# Patient Record
Sex: FEMALE | Race: WHITE | NOT HISPANIC OR LATINO | Employment: OTHER | URBAN - METROPOLITAN AREA
[De-identification: names, ages, dates, MRNs, and addresses within clinical notes are randomized per-mention and may not be internally consistent; named-entity substitution may affect disease eponyms.]

---

## 2020-02-26 ENCOUNTER — APPOINTMENT (OUTPATIENT)
Dept: CT IMAGING | Facility: CLINIC | Age: 70
End: 2020-02-26
Attending: NURSE PRACTITIONER
Payer: MEDICARE

## 2020-02-26 ENCOUNTER — HOSPITAL ENCOUNTER (EMERGENCY)
Facility: CLINIC | Age: 70
Discharge: HOME | End: 2020-02-26
Attending: NURSE PRACTITIONER | Admitting: NURSE PRACTITIONER
Payer: MEDICARE

## 2020-02-26 VITALS
RESPIRATION RATE: 16 BRPM | HEART RATE: 94 BPM | TEMPERATURE: 98.2 F | DIASTOLIC BLOOD PRESSURE: 93 MMHG | OXYGEN SATURATION: 96 % | SYSTOLIC BLOOD PRESSURE: 151 MMHG

## 2020-02-26 DIAGNOSIS — Z79.01 CHRONIC ANTICOAGULATION: ICD-10-CM

## 2020-02-26 DIAGNOSIS — S09.90XA CLOSED HEAD INJURY, INITIAL ENCOUNTER: ICD-10-CM
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
## 2020-02-26 PROBLEM — M54.50 LOW BACK PAIN: Status: ACTIVE | Noted: 2019-08-24

## 2020-02-26 PROBLEM — F41.8 DEPRESSION WITH ANXIETY: Status: ACTIVE | Noted: 2018-03-20

## 2020-02-26 PROBLEM — M17.9 KNEE OSTEOARTHRITIS: Status: ACTIVE | Noted: 2018-03-20

## 2020-02-26 PROBLEM — G47.33 OSA (OBSTRUCTIVE SLEEP APNEA): Status: ACTIVE | Noted: 2018-03-20

## 2020-02-26 PROBLEM — M85.80 OSTEOPENIA: Status: ACTIVE | Noted: 2018-03-20

## 2020-02-26 PROBLEM — I10 HTN (HYPERTENSION): Status: ACTIVE | Noted: 2018-03-20

## 2020-02-26 PROBLEM — F51.5 NIGHTMARE DISORDER: Status: ACTIVE | Noted: 2019-08-24

## 2020-02-26 PROBLEM — M54.2 CERVICALGIA: Status: ACTIVE | Noted: 2019-08-24

## 2020-02-26 PROBLEM — Z79.891 LONG TERM (CURRENT) USE OF OPIATE ANALGESIC: Status: ACTIVE | Noted: 2019-08-24

## 2020-02-26 PROBLEM — R93.1 AGATSTON CORONARY ARTERY CALCIUM SCORE LESS THAN 100: Status: ACTIVE | Noted: 2018-06-04

## 2020-02-26 PROBLEM — M50.30 DDD (DEGENERATIVE DISC DISEASE), CERVICAL: Status: ACTIVE | Noted: 2018-03-20

## 2020-02-26 PROBLEM — E03.9 HYPOTHYROIDISM (ACQUIRED): Status: ACTIVE | Noted: 2018-03-20

## 2020-02-26 PROCEDURE — 25000132 ZZH RX MED GY IP 250 OP 250 PS 637: Mod: GY | Performed by: NURSE PRACTITIONER

## 2020-02-26 PROCEDURE — 72131 CT LUMBAR SPINE W/O DYE: CPT

## 2020-02-26 PROCEDURE — 72125 CT NECK SPINE W/O DYE: CPT

## 2020-02-26 PROCEDURE — 70450 CT HEAD/BRAIN W/O DYE: CPT

## 2020-02-26 PROCEDURE — 99285 EMERGENCY DEPT VISIT HI MDM: CPT | Mod: 25

## 2020-02-26 RX ORDER — VENLAFAXINE HYDROCHLORIDE 150 MG/1
150 CAPSULE, EXTENDED RELEASE ORAL
Status: ON HOLD | COMMUNITY
Start: 2019-05-24 | End: 2023-03-29

## 2020-02-26 RX ORDER — ACETAMINOPHEN 500 MG
1000 TABLET ORAL ONCE
Status: COMPLETED | OUTPATIENT
Start: 2020-02-26 | End: 2020-02-26

## 2020-02-26 RX ORDER — LEVOTHYROXINE SODIUM 88 UG/1
88 TABLET ORAL DAILY
COMMUNITY
Start: 2019-12-10

## 2020-02-26 RX ORDER — ZOLPIDEM TARTRATE 10 MG/1
10 TABLET ORAL
Status: ON HOLD | COMMUNITY
Start: 2018-05-21 | End: 2023-03-29

## 2020-02-26 RX ORDER — ROSUVASTATIN CALCIUM 10 MG/1
10 TABLET, COATED ORAL
Status: ON HOLD | COMMUNITY
Start: 2019-08-13 | End: 2023-03-29

## 2020-02-26 RX ORDER — METOPROLOL SUCCINATE 25 MG/1
TABLET, EXTENDED RELEASE ORAL
Status: ON HOLD | COMMUNITY
Start: 2019-06-05 | End: 2023-03-29

## 2020-02-26 RX ADMIN — ACETAMINOPHEN 1000 MG: 500 TABLET, FILM COATED ORAL at 11:58

## 2020-02-26 ASSESSMENT — ENCOUNTER SYMPTOMS
HEMATURIA: 0
WEAKNESS: 0
VOMITING: 0
LIGHT-HEADEDNESS: 0
HEADACHES: 0
ABDOMINAL PAIN: 0
PHOTOPHOBIA: 0
NECK STIFFNESS: 1
FEVER: 0
NUMBNESS: 0
COUGH: 0
BACK PAIN: 0
NECK PAIN: 0
SHORTNESS OF BREATH: 0
NAUSEA: 0
DIZZINESS: 0
WOUND: 0

## 2020-02-26 NOTE — ED AVS SNAPSHOT
Emergency Department  [address] [facility] [address]  Phone:  [phone]  Fax:  [phone]                                    Nadja Angulo   MRN: [id number]    Department:   Emergency Department   Date of Visit:  2/26/2020           After Visit Summary Signature Page    I have received my discharge instructions, and my questions have been answered. I have discussed any challenges I see with this plan with the nurse or doctor.    ..........................................................................................................................................  Patient/Patient Representative Signature      ..........................................................................................................................................  Patient Representative Print Name and Relationship to Patient    ..................................................               ................................................  Date                                   Time    ..........................................................................................................................................  Reviewed by Signature/Title    ...................................................              ..............................................  Date                                               Time          22EPIC Rev 08/18

## 2020-02-26 NOTE — ED PROVIDER NOTES
Emergency Department Attending Supervision Note  2/26/2020  12:26 PM      I evaluated this patient in conjunction with Gloria Ndiaye, CNP    Briefly, the patient presented after a slip and fall on the ice.  She hit her head.  She has had neck and back surgery.  She is concerned about intracranial bleed since she is on Eliquis.  She had no LOC.  She has been able to get up and ambulate since the fall.  She has no extremity pain.  No hip pain no pain in the hips while walking.  No wrist pain.  There is no laceration or bleeding.    On my exam:  Physical Exam   General:  Sitting on bed with  at bedside, comfortable appearing.   HENT:  No obvious trauma to head  Right Ear:  External ear normal.   Left Ear:  External ear normal.   Nose:  Nose normal.   Eyes:  Conjunctivae and EOM are normal.  Neck: Normal range of motion. Neck supple. No tracheal deviation present.   Pulm/Chest: No respiratory distress  M/S: Normal range of motion. Patient able to stand and ambulate without concern.  Neuro: Alert. GCS 15.  Cranial nerves II to XII grossly intact.  Skin: Skin is warm and dry. No rash noted. Not diaphoretic.   Psych: Normal mood and affect. Behavior is normal.     Brief MDM:  Nadja Angulo is a very pleasant 69 year old year old patient who presents to the emergency department with concern of a slip and fall on the ice.  She hit her head.  She is on Eliquis.  According to [place] head CT rules a CT scan is indicated.  Fortunately this shows no evidence of intracranial bleed.  She has had neck and back surgery; therefore, she underwent CT imaging of the neck and back as well.  There is no evidence of lumbar cervical spine fracture or loosening of the hardware.  There is no evidence of extremity fracture as she has no pain.  I discussed the rare chance of delayed intracranial bleed.  Patient desires and feels safe going home.  She is with her .  I discussed what to watch for and when to return to the  emergency department immediately with any concern of possible delayed intracranial bleed.    The treatment plan was discussed with the patient and they expressed understanding of this plan and consented to the plan.  In addition, the patient will return to the emergency department if their symptoms persist, worsen, if new symptoms arise or if there is any concern as other pathology may be present that is not evident at this time. They also understand the importance of close follow up in the clinic and if unable to do so will return to the emergency department for a reevaluation. All questions were answered.    Diagnosis    ICD-10-CM    1. Fall, initial encounter W19.XXXA    2. Closed head injury, initial encounter S09.90XA          I, Bradley Aasen, am serving as a scribe on 2/26/2020 at 12:26 PM to personally document services performed by Christopher Lisa DO based on my observations and the provider's statements to me.        Christopher Lisa DO  02/26/20 1314

## 2020-02-26 NOTE — ED TRIAGE NOTES
"Fell on ice 1.5 hours ago, hit head, on eloquis , hx a fib and stroke, no LOC , but had a \"visual thing\" hx neck fusion and low back , sore into butt   "

## 2020-02-26 NOTE — ED PROVIDER NOTES
"  History     Chief Complaint:  Fall     The history is provided by the patient.      Nadja Angulo is a 69 year old female, with history pertinent for TBI in 2005, Cervical spine and lumbar spine fusion, atrial fibrillation s/p ablation on Eliquis, who presents after fall with head injury. Approximately 90 minutes ago, patient notes she slipped on the ice and landed on her upper buttocks/lower back and states she also struck the back of her head on the ice. She reports noting \"black dots\" in her visual field that lasted for a second though complains of persistent generalized muscle tightness. Nadja otherwise denies any headache, visual disturbance, abdominal pain, focal numbness/weakness, dizziness/lightheadedness, nausea/vomiting, analgesics today, or prior abdominal surgeries. She raised concerns for her cervical and lumbar fusions secondary to her fall.       Allergies:  Hydrocodone     Medications:    Ambien  Effexor  Toprol  Crestor  Eliquis  Toprol  Synthroid      Past Medical History:    Afib  Hyperlipidemia  TBI (2005)  Osteopenia  CHRISTIE  Insomnia  Hypothyroidism  CVA  Depression with anxiety     Past Surgical History:    Lumbar fusion L4-L7 (2007)  Ablation   ORIF right wrist   Cervical fusion  Bilateral knee surgery  Lumbar discectomy  Tooth extraction      Family History:    Prostate cancer  HTN  Osteoarthritis  CVA  CAD  Depression  MI  Hypothyroidism      Social History:  Accompanied by .  Never Smoker  Alcohol Use: No  Marital Status:       Review of Systems   Constitutional: Negative for fever.   HENT: Negative for ear pain, hearing loss and tinnitus.    Eyes: Negative for photophobia and visual disturbance.   Respiratory: Negative for cough and shortness of breath.    Cardiovascular: Negative for chest pain.   Gastrointestinal: Negative for abdominal pain.   Genitourinary: Negative for hematuria.   Musculoskeletal: Positive for neck stiffness. Negative for back pain, gait problem and " neck pain.   Skin: Negative for wound.   Neurological: Negative for dizziness, weakness, light-headedness, numbness and headaches.   All other systems reviewed and are negative.        Physical Exam     Patient Vitals for the past 24 hrs:   BP Temp Temp src Pulse Resp SpO2   02/26/20 1119 (!) 151/93 98.2  F (36.8  C) Oral 94 16 96 %       Physical Exam  General: Alert. Well kept.  HEENT:   Head: No facial asymmetry. No palpable scalp hematomas or bony step offs. No frontal or maxillary facial tenderness.   Eyes: Normal conjunctiva. No scleral icterus. PERRLA. EOMI. No raccoon s eyes.   Ears: Normal pinnae. Normal external auditory canals. Normal tympanic membranes. No hemotympanum bilaterally. No Almaraz's signs.   Nose: No deformity. No nasal drainage.   Throat: Moist mucous membranes. No evidence for intraoral trauma.   Neck: Supple, no nuchal rigidity. No midline tenderness over cervical spine or paraspinal musculature. Normal range of motion.   Cardiac: Normal rate and regular rhythm. Normal heart sounds. No murmurs, rubs, or gallops appreciated. Intact distal pulses.   Pulmonary: CTA bilaterally. Normal breath sounds. No wheezing, crackles, or rhonchi appreciated.   Abdomen: Soft, non-tender, non-distended. No rebound or guarding.   Neuro: GCS 15. Alert and oriented. Cranial nerves II-XII intact. 5/5 strength equal bilateral upper and lower extremities. Gait smooth. Finger-nose-finger coordinated and equal bilateral. Visual fields bilateral without deficit.  MUSCULOSKELETAL: Normal gross range of motion of all 4 extremities. No midline tenderness over thoracic, lumbar or sacral spine.   Upper extremities: 5/5 symmetric strength and ROM with shoulder abduction and adduction, elbow flexion and extension, dorsi- and palmar-flexion, , compartments soft.   Lower extremities: 5/5 symmetric strength and ROM with dorsi- and plantar-flexion, knee flexion and extension, hip flexion, hip internal and external  rotation, compartments soft.   SKIN: Skin is warm and dry. No rashes, petechiae or pallor. Normal appearance of visualized exposed skin.   PSYCH: Normal affect and mood. Good eye contact.    Emergency Department Course     Imaging:  Lumbar spine CT w/o contrast  1. No evidence of acute fracture in the lumbar spine.  2. Sequela of previous fusion from L4 to S1. No evidence of hardware loosening by CT.  3. Degenerative changes at the nonfused levels as described above, most pronounced at the L3-4 level just above the fusion where there is mild spinal canal narrowing as well as moderate to severe bilateral neural foraminal narrowing.   Reading per radiology     Cervical spine CT w/o contrast  1. No evidence of acute fracture or subluxation in the cervical spine.  2. Sequela of anterior fusion from C3 to C6. Hardware appears intact without evidence of loosening. Solid bony fusion from C3 to C6.   ROWDY FELICIANO MD  Reading per radiology     Head CT w/o contrast  No evidence of acute intracranial hemorrhage, mass, or herniation.   ROWDY FELICIANO MD  Reading per radiology     Interventions:  1158: Tylenol 1000 mg PO     Emergency Department Course:  Nursing notes and vitals reviewed.  1145    I performed an exam of the patient as documented above.   1224    The patient was sent for a CT while in the emergency department, results above.   I discussed the patient in shared service with Dr. Patrice Lisa    I personally reviewed the imaging results with the Patient and spouse and answered all related questions prior to discharge.     Impression & Plan   Medical Decision Making:  Nadja Angulo is a 69 year old female, anticoagulated on Eliquis, who presents after fall with head injury.  The differential diagnosis includes skull fracture, epidural hematoma, subdural hematoma, intracerebral hemorrhage, and traumatic subarachnoid hemorrhage.  There are no physical signs of skull fracture or other bony fracture on exam and the  "patient is well-appearing, but using [place] CT guidelines, a CT of the head was indicated.  Fortunately, no signs of intracerebral bleed or skull fracture were detected during this visit on CT imaging.  Despite the normal neuroimaging,  the patient/family understands that they must return if any \"red flags\" appear/develop in the coming hours/days, as this may represent an indication to perform another CT scan.  I have noted that \"red flags\" include: lethargy or irritability,  strange behavior, seizures, repeated vomiting, weakness or loss of responsiveness. Although rare, delayed CNS bleeds can occur, and the patient were notified of this.  Unable to be cleared by [place] criteria and secondary to her prior spinal fusions a CT of the cervical spine and lumbar spine were obtained.  These returned negative for fracture or hardware malalignment.  No other injury noted on detailed trauma examination.  Patient was able to ambulate without complication prior to discharge and remained neurologically intact.  Closed head injury instructions were given.    Diagnosis:    ICD-10-CM    1. Fall, initial encounter W19.XXXA    2. Closed head injury, initial encounter S09.90XA        Disposition:  Discharged to home.    Discharge Medications:  New Prescriptions    No medications on file     Gloria Elton DNP   2/26/2020    EMERGENCY DEPARTMENT       Elton, Gloria, CNP  02/26/20 2213    "

## 2020-02-26 NOTE — ED PROVIDER NOTES
"  History     Chief Complaint:  Fall    The history is provided by the patient.      Nadja Angulo is a 69 year old female, with history pertinent for TBI in 2005, atrial fibrillation s/p ablation, and prior CVA on Eliquis, who presents after fall with head injury. Approximately 90 minutes ago, patient notes she slipped on the ice and landed on her upper buttocks/lower back and states she also struck the back of her head on the ice. She reports noting \"black dots\" in her visual field that lasted for a second though complains of persistent generalized muscle tightness. Nadja otherwise denies any headache, visual disturbance, abdominal pain, focal numbness/weakness, dizziness/lightheadedness, nausea/vomiting, analgesics today, or prior abdominal surgeries. She raised concerns for her cervical and lumbar fusions secondary to her fall.      Allergies:  Hydrocodone    Medications:    Ambien  Effexor  Toprol  Crestor  Eliquis  Toprol  Synthroid     Past Medical History:    Afib  Hyperlipidemia  TBI (2005)  Osteopenia  CHRISTIE  Insomnia  Hypothyroidism  CVA  Depression with anxiety    Past Surgical History:    Lumbar fusion L4-L7 (2007)  Ablation   ORIF right wrist   Cervical fusion  Bilateral knee surgery  Lumbar discectomy  Tooth extraction     Family History:    Prostate cancer  HTN  Osteoarthritis  CVA  CAD  Depression  MI  Hypothyroidism     Social History:  Accompanied by .  Never Smoker  Alcohol Use: No  Marital Status:       Review of Systems   Eyes: Positive for visual disturbance.   Gastrointestinal: Negative for abdominal pain, nausea and vomiting.   Musculoskeletal:        +generalized muscle tightness   Neurological: Negative for dizziness, weakness, light-headedness, numbness and headaches.   All other systems reviewed and are negative.  ***  Physical Exam   Patient Vitals for the past 24 hrs:   BP Temp Temp src Pulse Resp SpO2   02/26/20 1119 (!) 151/93 98.2  F (36.8  C) Oral 94 16 96 % "     Physical Exam  General: Alert. Well kept.  HEENT:   Head: No facial asymmetry. No palpable scalp hematomas or bony step offs. No frontal or maxillary facial tenderness.   Eyes: Normal conjunctiva. No scleral icterus. PERRLA. EOMI. No raccoon s eyes.   Ears: Normal pinnae. Normal external auditory canals. Normal tympanic membranes. No hemotympanum bilaterally. No Almaraz's signs.   Nose: No deformity. No nasal drainage.   Throat: Moist mucous membranes. No evidence for intraoral trauma.   Neck: Supple, no nuchal rigidity. No midline tenderness over cervical spine or paraspinal musculature. Normal range of motion.   Cardiac: Normal rate and regular rhythm. Normal heart sounds. No murmurs, rubs, or gallops appreciated. Intact distal pulses.   Pulmonary: CTA bilaterally. Normal breath sounds. No wheezing, crackles, or rhonchi appreciated.   Abdomen: Soft, non-tender, non-distended. No rebound or guarding.   Neuro***: GCS 15. Alert and oriented. Cranial nerves II-XII intact. 5/5 strength equal bilateral upper and lower extremities. Gait smooth. Finger-nose-finger coordinated and equal bilateral. Heel shin smooth and equal bilateral. Visual fields bilateral without deficit.  MUSCULOSKELETAL: Normal gross range of motion of all 4 extremities. No midline tenderness over thoracic, lumbar or sacral spine.   Upper extremities: 5/5 symmetric strength and ROM with shoulder abduction and adduction, elbow flexion and extension, dorsi- and palmar-flexion, , compartments soft.   Lower extremities: 5/5 symmetric strength and ROM with dorsi- and plantar-flexion, knee flexion and extension, hip flexion, hip internal and external rotation, compartments soft.   SKIN: Skin is warm and dry. No rashes, petechiae or pallor. Normal appearance of visualized exposed skin.   PSYCH: Normal affect and mood. Good eye contact.    Emergency Department Course     Imaging:  Lumbar spine CT w/o contrast  1. No evidence of acute fracture in the  lumbar spine.  2. Sequela of previous fusion from L4 to S1. No evidence of hardware loosening by CT.  3. Degenerative changes at the nonfused levels as described above, most pronounced at the L3-4 level just above the fusion where there is mild spinal canal narrowing as well as moderate to severe bilateral neural foraminal narrowing.   Reading per radiology    Cervical spine CT w/o contrast  1. No evidence of acute fracture or subluxation in the cervical spine.  2. Sequela of anterior fusion from C3 to C6. Hardware appears intact without evidence of loosening. Solid bony fusion from C3 to C6.   ROWDY FELICIANO MD  Reading per radiology    Head CT w/o contrast  No evidence of acute intracranial hemorrhage, mass, or herniation.   ROWDY FELICIANO MD  Reading per radiology    Interventions:  1158: Tylenol 1000 mg PO    Emergency Department Course:  Nursing notes and vitals reviewed.  1145 I performed an exam of the patient as documented above.   1224 The patient was sent for a CT while in the emergency department, results above.   1226 Shared services with Dr. Lisa, please see his note for additional details.   1258 Findings and plan explained to the patient. Patient discharged home with instructions regarding supportive care, medications, and reasons to return. The importance of close follow-up was reviewed.     Impression & Plan      Medical Decision Making:  ***    Diagnosis:    ICD-10-CM   1. Fall, initial encounter W19.XXXA   2. Closed head injury, initial encounter S09.90XA   3. Chronic anticoagulation Z79.01     Disposition: discharged to home    Scribe Disclosure:   Nathan PURI, am serving as a scribe at 11:52 AM on 2/26/2020 to document services personally performed by Gloria Ndiaye CNP based on my observations and the provider's statements to me.      EMERGENCY DEPARTMENT

## 2020-04-25 ENCOUNTER — HOSPITAL ENCOUNTER (EMERGENCY)
Facility: CLINIC | Age: 70
Discharge: HOME | End: 2020-04-25
Attending: EMERGENCY MEDICINE | Admitting: EMERGENCY MEDICINE
Payer: MEDICARE

## 2020-04-25 ENCOUNTER — APPOINTMENT (OUTPATIENT)
Dept: CT IMAGING | Facility: CLINIC | Age: 70
End: 2020-04-25
Attending: EMERGENCY MEDICINE
Payer: MEDICARE

## 2020-04-25 VITALS
DIASTOLIC BLOOD PRESSURE: 73 MMHG | TEMPERATURE: 97.6 F | RESPIRATION RATE: 15 BRPM | SYSTOLIC BLOOD PRESSURE: 133 MMHG | HEART RATE: 73 BPM | OXYGEN SATURATION: 97 %

## 2020-04-25 DIAGNOSIS — I10 HYPERTENSION, UNSPECIFIED TYPE: ICD-10-CM

## 2020-04-25 DIAGNOSIS — R51.9 NONINTRACTABLE HEADACHE, UNSPECIFIED CHRONICITY PATTERN, UNSPECIFIED HEADACHE TYPE: ICD-10-CM

## 2020-04-25 LAB
ANION GAP SERPL CALCULATED.3IONS-SCNC: 5 MMOL/L (ref 3–14)
BASOPHILS # BLD AUTO: 0 10E9/L (ref 0–0.2)
BASOPHILS NFR BLD AUTO: 0.6 %
BUN SERPL-MCNC: 18 MG/DL (ref 7–30)
CALCIUM SERPL-MCNC: 9.2 MG/DL (ref 8.5–10.1)
CHLORIDE SERPL-SCNC: 105 MMOL/L (ref 94–109)
CO2 SERPL-SCNC: 28 MMOL/L (ref 20–32)
CREAT SERPL-MCNC: 0.75 MG/DL (ref 0.52–1.04)
DIFFERENTIAL METHOD BLD: NORMAL
EOSINOPHIL # BLD AUTO: 0.1 10E9/L (ref 0–0.7)
EOSINOPHIL NFR BLD AUTO: 2.3 %
ERYTHROCYTE [DISTWIDTH] IN BLOOD BY AUTOMATED COUNT: 13.1 % (ref 10–15)
GFR SERPL CREATININE-BSD FRML MDRD: 80 ML/MIN/{1.73_M2}
GLUCOSE SERPL-MCNC: 91 MG/DL (ref 70–99)
HCT VFR BLD AUTO: 42.6 % (ref 35–47)
HGB BLD-MCNC: 14.1 G/DL (ref 11.7–15.7)
IMM GRANULOCYTES # BLD: 0 10E9/L (ref 0–0.4)
IMM GRANULOCYTES NFR BLD: 0.2 %
INTERPRETATION ECG - MUSE: NORMAL
LYMPHOCYTES # BLD AUTO: 2.2 10E9/L (ref 0.8–5.3)
LYMPHOCYTES NFR BLD AUTO: 42.1 %
MCH RBC QN AUTO: 30.3 PG (ref 26.5–33)
MCHC RBC AUTO-ENTMCNC: 33.1 G/DL (ref 31.5–36.5)
MCV RBC AUTO: 91 FL (ref 78–100)
MONOCYTES # BLD AUTO: 0.5 10E9/L (ref 0–1.3)
MONOCYTES NFR BLD AUTO: 8.5 %
NEUTROPHILS # BLD AUTO: 2.5 10E9/L (ref 1.6–8.3)
NEUTROPHILS NFR BLD AUTO: 46.3 %
NRBC # BLD AUTO: 0 10*3/UL
NRBC BLD AUTO-RTO: 0 /100
PLATELET # BLD AUTO: 234 10E9/L (ref 150–450)
POTASSIUM SERPL-SCNC: 3.9 MMOL/L (ref 3.4–5.3)
RBC # BLD AUTO: 4.66 10E12/L (ref 3.8–5.2)
SODIUM SERPL-SCNC: 138 MMOL/L (ref 133–144)
WBC # BLD AUTO: 5.3 10E9/L (ref 4–11)

## 2020-04-25 PROCEDURE — 25000132 ZZH RX MED GY IP 250 OP 250 PS 637: Mod: GY | Performed by: EMERGENCY MEDICINE

## 2020-04-25 PROCEDURE — 85025 COMPLETE CBC W/AUTO DIFF WBC: CPT | Performed by: EMERGENCY MEDICINE

## 2020-04-25 PROCEDURE — 99285 EMERGENCY DEPT VISIT HI MDM: CPT | Mod: 25

## 2020-04-25 PROCEDURE — 96374 THER/PROPH/DIAG INJ IV PUSH: CPT

## 2020-04-25 PROCEDURE — 93005 ELECTROCARDIOGRAM TRACING: CPT

## 2020-04-25 PROCEDURE — 25000128 H RX IP 250 OP 636: Performed by: EMERGENCY MEDICINE

## 2020-04-25 PROCEDURE — 80048 BASIC METABOLIC PNL TOTAL CA: CPT | Performed by: EMERGENCY MEDICINE

## 2020-04-25 PROCEDURE — 70450 CT HEAD/BRAIN W/O DYE: CPT

## 2020-04-25 RX ORDER — METOCLOPRAMIDE 10 MG/1
10 TABLET ORAL 3 TIMES DAILY PRN
Qty: 20 TABLET | Refills: 0 | Status: ON HOLD | OUTPATIENT
Start: 2020-04-25 | End: 2023-03-29

## 2020-04-25 RX ORDER — ACETAMINOPHEN 325 MG/1
975 TABLET ORAL ONCE
Status: COMPLETED | OUTPATIENT
Start: 2020-04-25 | End: 2020-04-25

## 2020-04-25 RX ORDER — METOCLOPRAMIDE HYDROCHLORIDE 5 MG/ML
5 INJECTION INTRAMUSCULAR; INTRAVENOUS ONCE
Status: COMPLETED | OUTPATIENT
Start: 2020-04-25 | End: 2020-04-25

## 2020-04-25 RX ADMIN — ACETAMINOPHEN 975 MG: 325 TABLET, FILM COATED ORAL at 20:37

## 2020-04-25 RX ADMIN — METOCLOPRAMIDE 5 MG: 5 INJECTION, SOLUTION INTRAMUSCULAR; INTRAVENOUS at 20:36

## 2020-04-25 ASSESSMENT — ENCOUNTER SYMPTOMS
WEAKNESS: 0
DIZZINESS: 0
SHORTNESS OF BREATH: 0
FEVER: 0
HEADACHES: 1
COUGH: 0
PHOTOPHOBIA: 1

## 2020-04-25 NOTE — ED AVS SNAPSHOT
Emergency Department  [address] [facility] [address]  Phone:  [phone]  Fax:  [phone]                                    Nadja Angulo   MRN: [id number]    Department:   Emergency Department   Date of Visit:  4/25/2020           After Visit Summary Signature Page    I have received my discharge instructions, and my questions have been answered. I have discussed any challenges I see with this plan with the nurse or doctor.    ..........................................................................................................................................  Patient/Patient Representative Signature      ..........................................................................................................................................  Patient Representative Print Name and Relationship to Patient    ..................................................               ................................................  Date                                   Time    ..........................................................................................................................................  Reviewed by Signature/Title    ...................................................              ..............................................  Date                                               Time          22EPIC Rev 08/18

## 2020-04-26 NOTE — ED PROVIDER NOTES
History   Chief Complaint:  Hypertension and Headache    HPI   Nadja Angulo is a 69 year old female, anticoagulated on Eliquis with a history of atrial fibrillation, hypertension, hyperlipidemia, and TBI, who presents to the ED for evaluation of headache and hypertension. The patient reports 3-4 days ago having the onset of a headache and randomly checked her blood pressure and noted it to be at 150/90. She states this is high for her as she normally stays around 110/60-70. She checked her blood pressure again two days ago and found it to still be elevated. The patient called her nurse line  today and was instructed to take 50 mg of her Toprol, which is double her normal dose. The patient conveys she has no other symptoms, but is slightly sensitive to light. She does not normally get headaches. She has not lost her vision, have  cough, fever, shortness of breath, chest pain, dizzy, weak, or have any other acute symptoms.     Allergies:  Hydrocodone    Medications:    Tylenol #3  Eliquis  Levothyroxine  Toprol  Crestor  Effexor  Ambien    Past Medical History:    Atrial fibrillation  Hyperlipidemia  TBI  Hypertension  Depression  Anxiety  Osteopenia  Thyroid disease    Past Surgical History:    Cataract extraction  C3-6 cervical fusion  Left knee replacement  Lumbar discectomy  ORIF wrist fracture repair  Tooth extraction    Family History:    Gout  Prostate cancer  Hypertension  Osteoarthritis  Stroke  CAD  Depression  MI  Hypothyroidism  Osteoarthritis    Social History:  Smoking status: Never  Alcohol use: No  Drug use: No  PCP: Claribel Vieira  Marital Status:   [2]    Review of Systems   Constitutional: Negative for fever.   Eyes: Positive for photophobia.   Respiratory: Negative for cough and shortness of breath.    Cardiovascular: Negative for chest pain.   Neurological: Positive for headaches. Negative for dizziness and weakness.   All other systems reviewed and are negative.    Physical Exam      Patient Vitals for the past 24 hrs:   BP Temp Temp src Pulse Heart Rate Resp SpO2   04/25/20 2116 (!) 141/88 -- -- 71 70 25 97 %   04/25/20 2047 -- -- -- -- 71 17 96 %   04/25/20 2000 (!) 158/93 -- -- -- -- -- 97 %   04/25/20 1954 (!) 176/108 -- -- 83 -- -- --   04/25/20 1902 (!) 168/97 97.6  F (36.4  C) Temporal 88 88 18 97 %     Physical Exam  General: Alert, interactive in mild distress  Head:  Scalp is atraumatic  Eyes:  The pupils are equal, round, and reactive to light    EOM's intact    No scleral icterus  ENT:      Nose:  The external nose is normal  Ears:  External ears are normal  Mouth/Throat: The oropharynx is normal    Mucus membranes are moist      Neck:  Normal range of motion.      There is no rigidity.    Trachea is in the midline         CV:  Regular rate and rhythm    No murmur   Resp:  Breath sounds are clear bilaterally    Non-labored, no retractions or accessory muscle use      GI:  Abdomen is soft, no distension, no tenderness.       MS:  Normal strength in all 4 extremities  Skin:  Warm and dry, No rash or lesions noted.  Neuro:      Strength 5/5 x4.  Sensation intact  In all 4 extremities.      Cranial nerves 2-12 grossly intact.    GCS: 15  Psych:  Awake. Alert.  Normal affect.      Appropriate interactions.    Emergency Department Course   ECG (20:02:26):  Rate 73 bpm. ID interval 152. QRS duration 84. QT/QTc 392/431. P-R-T axes 15 37 62. Normal Sinus rhythm with sinus arrhythmia. Normal ECG. Interpreted at 2014 by TriggerBbeo MD.    Imaging:  Radiology findings were communicated with the patient who voiced understanding of the findings.    CT Head without contrast:  No evidence of acute intracranial hemorrhage, mass, or   herniation.     Imaging independently reviewed and agree with radiologist interpretation.     Laboratory:  Laboratory findings were communicated with the patient who voiced understanding of the findings.    CBC: WNL (WBC 5.3, HGB 14.1, )    BMP:  WNL (Creatinine 0.75)    Interventions:  2037: Tylenol 975 mg PO  2036: Reglan 5 mg IV      Emergency Department Course:  Past medical records, nursing notes, and vitals reviewed.    1958 I performed an exam of the patient as documented above.     EKG obtained in the ED, see results above.   IV was inserted and blood was drawn for laboratory testing, results above.  The patient was sent for a head CT while in the emergency department, results above.     2109 I rechecked the patient and discussed the results of her workup thus far. She feels better after medicine administration.    2135    I rechecked the patient. Explained findings to patient.    Findings and plan explained to the Patient. Patient discharged home with instructions regarding supportive care, medications, and reasons to return. The importance of close follow-up was reviewed.    I personally reviewed the laboratory and imaging results with the Patient and answered all related questions prior to discharge.     Impression & Plan   Medical Decision Making:  Nadja Angulo is a 69 year old female who was seen and evaluated. The above work up was undertaken. She was initially hypertensive, however, taking her home Metoprolol and her blood pressure trending down nicely in the emergency department. There are no signs of acute intracranial hemorrhage, no chest pain or shortness of breath to suggest acute coronary syndrome or pulmonary embolism. No signs of end organ damage including kidney disease or electrolyte abnormality. She had complete resolution of her headache here and her blood pressure trended down to the 130's systolic. She is feeling much improved and I think she can safely be discharged to home. I have prescribed the medications below and recommend she continue taking 25 mg metoprolol XL a day. She will return if new symptoms develop.      Diagnosis:    ICD-10-CM    1. Nonintractable headache, unspecified chronicity pattern, unspecified headache type   R51    2. Hypertension, unspecified type  I10        Disposition:  Discharged to home.    Discharge Medications:  New Prescriptions    METOCLOPRAMIDE (REGLAN) 10 MG TABLET    Take 1 tablet (10 mg) by mouth 3 times daily as needed (N/V; Headache)       Scribe Disclosure:  Clinton PURI, am serving as a scribe at 7:58 PM on 4/25/2020 to document services personally performed by Bebo Hassan MD based on my observations and the provider's statements to me.      Bebo Hassan MD  04/25/20 0257

## 2020-04-26 NOTE — DISCHARGE INSTRUCTIONS
Discharge Instructions  Headache    You were seen today for a headache. Headaches may be caused by many different things such as muscle tension, sinus inflammation, anxiety and stress, having too little sleep, too much alcohol, some medical conditions or injury. You may have a migraine, which is caused by changes in the blood vessels in your head.  At this time your provider does not find that your headache is a sign of anything dangerous or life-threatening.  However, sometimes the signs of serious illness do not show up right away.      Generally, every Emergency Department visit should have a follow-up clinic visit with either a primary or a specialty clinic/provider. Please follow-up as instructed by your emergency provider today.    Return to the Emergency Department if:  You get a new fever of 100.4 F or higher.  Your headache gets much worse.  You get a stiff neck with your headache.  You get a new headache that is significantly different or worse than headaches you have had before.  You are vomiting (throwing up) and cannot keep food or water down.  You have blurry or double vision or other problems with your eyes.  You have a new weakness on one side of your body.  You have difficulty with balance which is new.  You or your family thinks you are confused.  You have a seizure.    What can I do to help myself?  Pain medications - You may take a pain medication such as Tylenol  (acetaminophen), Advil , Motrin  (ibuprofen) or Aleve  (naproxen).  Take a pain reliever as soon as you notice symptoms.  Starting medications as soon as you start to have symptoms may lessen the amount of pain you have.  Relaxing in a quiet, dark room may help.  Get enough sleep and eat meals regularly.  You may need to watch for certain foods or other things which may trigger your headaches.  Keeping a journal of your headaches and possible triggers may help you and your primary provider to identify things which you should avoid which  may be causing your headaches.  If you were given a prescription for medicine here today, be sure to read all of the information (including the package insert) that comes with your prescription.  This will include important information about the medicine, its side effects, and any warnings that you need to know about.  The pharmacist who fills the prescription can provide more information and answer questions you may have about the medicine.  If you have questions or concerns that the pharmacist cannot address, please call or return to the Emergency Department.   Remember that you can always come back to the Emergency Department if you are not able to see your regular provider in the amount of time listed above, if you get any new symptoms, or if there is anything that worries you.     Discharge Instructions  Hypertension - High Blood Pressure    During you visit to the Emergency Department, your blood pressure was higher than the recommended blood pressure.  This may be related to stress, pain, medication or other temporary conditions. In these cases, your blood pressure may return to normal on its own. If you have a history of high blood pressure, you may need to have your provider adjust your medications. Sometimes, your high measurement here may indicate that you have developed high blood pressure that will stay high unless it is treated. As a general rule, high blood pressure causes problems over years rather than days, weeks, or months. So, while it is important to treat blood pressure, it is rarely important to treat blood pressure immediately. Occasionally we will begin a medication in the Emergency Department; more often we will recommend close follow-up for medications with a primary doctor/clinic.    Generally, every Emergency Department visit should have a follow-up clinic visit with either a primary or a specialty clinic/provider. Please follow-up as instructed by your emergency provider  today.    Return to the Emergency Department if you start to have:  A severe headache.  Chest pain.  Shortness of breath.  Weakness or numbness that affects one part of the body.  Confusion.  Vision changes.  Significant swelling of legs and/or eyes.  A reaction to any medication started in the Emergency Department.    What can I do to help myself?  Avoid alcohol.  Take any blood pressure medicine that you are prescribed.  Get a good night s sleep.  Lower your salt intake.  Exercise.  Lose weight.  Manage stress.  See your doctor regularly    If blood pressure medication was started in the Emergency Department:  The medicine may not have an immediate effect. The body and brain determine what blood pressure you have. The medicine s job is to retrain the body s  thermostat  to a lower blood pressure.  You will need to follow up with your provider to see how this medicine is working for you.  If you were given a prescription for medicine here today, be sure to read all of the information (including the package insert) that comes with your prescription.  This will include important information about the medicine, its side effects, and any warnings that you need to know about.  The pharmacist who fills the prescription can provide more information and answer questions you may have about the medicine.  If you have questions or concerns that the pharmacist cannot address, please call or return to the Emergency Department.   Remember that you can always come back to the Emergency Department if you are not able to see your regular provider in the amount of time listed above, if you get any new symptoms, or if there is anything that worries you.

## 2020-04-26 NOTE — ED TRIAGE NOTES
Pt states restarted on blood pressure meds a few days ago. Pt states headache today.     Pt states BP as home were 200s

## 2022-07-23 ENCOUNTER — HOSPITAL ENCOUNTER (EMERGENCY)
Facility: CLINIC | Age: 72
Discharge: HOME | End: 2022-07-23
Attending: EMERGENCY MEDICINE | Admitting: EMERGENCY MEDICINE
Payer: MEDICARE

## 2022-07-23 VITALS
BODY MASS INDEX: 21.68 KG/M2 | HEIGHT: 64 IN | WEIGHT: 127 LBS | RESPIRATION RATE: 20 BRPM | HEART RATE: 77 BPM | SYSTOLIC BLOOD PRESSURE: 149 MMHG | OXYGEN SATURATION: 100 % | TEMPERATURE: 98.9 F | DIASTOLIC BLOOD PRESSURE: 83 MMHG

## 2022-07-23 DIAGNOSIS — H65.02 NON-RECURRENT ACUTE SEROUS OTITIS MEDIA OF LEFT EAR: ICD-10-CM

## 2022-07-23 DIAGNOSIS — J34.89 SINUS PRESSURE: ICD-10-CM

## 2022-07-23 PROCEDURE — 93005 ELECTROCARDIOGRAM TRACING: CPT

## 2022-07-23 PROCEDURE — 99284 EMERGENCY DEPT VISIT MOD MDM: CPT | Mod: 25

## 2022-07-23 ASSESSMENT — ENCOUNTER SYMPTOMS
ARTHRALGIAS: 1
SINUS PRESSURE: 1
FEVER: 0

## 2022-07-23 NOTE — ED TRIAGE NOTES
"Pt states has pressure in L eye that started around 5:30PM.  No vision changes. Reports jaw \"numbness.\"  Sensation to face is equal on both sides.  Continues to states just \"doesn't feel right.\"  VSS.  Pt also reports hx of thyroid hx.     Pt states also had an US of her enlarged lymph node at [facility] earlier this week and has not had the results yet.      Triage Assessment     Row Name 07/23/22 [id number]       Triage Assessment (Adult)    Airway WDL WDL       Respiratory WDL    Respiratory WDL WDL       Skin Circulation/Temperature WDL    Skin Circulation/Temperature WDL WDL       Cardiac WDL    Cardiac WDL WDL       Peripheral/Neurovascular WDL    Peripheral Neurovascular WDL WDL       Cognitive/Neuro/Behavioral WDL    Cognitive/Neuro/Behavioral WDL WDL              "

## 2022-07-24 NOTE — DISCHARGE INSTRUCTIONS
I think you have some sinus pressure and some mild fluid in your left ear.  Try either Claritin or Zyrtec 1 tablet once a day.  Tylenol as needed.  If this gets worse, follow-up with your doctor in the clinic.

## 2022-07-24 NOTE — ED PROVIDER NOTES
"  History   Chief Complaint:  Facial Swelling (States since 5:30PM had L sided facial pain feeling \"fullness in my ears and swelling by my eye.\"  Denies vision changes.  )       HPI   Nadja Angulo is a 72 year old female with history of afib anticoagulated by Coumadin, hypertension, and hyperlipidemia who presents with sinus pressure. She walked her dog at 530 pm and developed a burning pain to her left foot that never happened before. No recent trauma. She also has had a sharp pain on her left knee and iced it that has been gone. At 620 pm tonight, while watching TV, she has had a fullness, heavy sensation in her left ear that felt plugged, which has persistent till now. She has had tinnitus on heft left ear yesterday which never happened before. She has had pain in her left jaw. She denies any soreness in her mouth. She felt that everything was progressing. She denies any cold recently or allergies. She denies any fever.      Review of Systems   Constitutional: Negative for fever.   HENT: Positive for sinus pressure and tinnitus.    Eyes: Negative for visual disturbance.   Musculoskeletal: Positive for arthralgias.   Skin: Negative for rash.   All other systems reviewed and are negative.        Allergies:  Hydrocodone    Medications:  Coumadin  levothyroxine (SYNTHROID/LEVOTHROID) 88 MCG tablet  metoclopramide (REGLAN) 10 MG tablet  metoprolol succinate ER (TOPROL-XL) 25 MG 24 hr tablet  rosuvastatin (CRESTOR) 10 MG tablet  venlafaxine (EFFEXOR-XR) 150 MG 24 hr capsule  zolpidem (AMBIEN) 10 MG tablet    Past Medical History:     Cervicalgia  DDD  Depression with anxiety  Nightmare disorder  Hypertension  Hypothyroidism  Knee osteoarthritis   CHRISTIE  afib  TBI  Hyperlipidemia    Past Surgical History:    Ablation of afib  Cataract extraction  Knee replacement  Lumbar discectomy  Tooth extraction  ORIF wrist right    Family History:    Brother: Gout  Father: prostate cancer, Hypertension, stroke  Mother: CAD, " "depression, heart attack, osteoarthritis    Social History:  Presents alone. Never smoker.     Physical Exam     Patient Vitals for the past 24 hrs:   BP Temp Temp src Pulse Resp SpO2 Height Weight   07/23/22 1851 (!) 149/83 98.9  F (37.2  C) Temporal 77 20 100 % 1.626 m (5' 4\") 57.6 kg (127 lb)       Physical Exam  Nursing note and vitals reviewed.  Constitutional:  Alert.  Appears comfortable.   HENT:    Normal visual acuity. Pupil equal and reactive.  Eyes:    Conjunctivae are normal.      Right eye exhibits no discharge. Left eye exhibits no discharge.   Lymph:   Normal non-tendered lymph nodes in her neck.  Neurological:   Alert and appropriate. No focal weakness.  Skin:    Skin is warm and dry. No rash noted. No diaphoresis.   Psychiatric:   Behavior is normal. Judgment and thought content normal.   Neuro:  No hand drift. No facial drool. Speech is normal. Extraocular movement is intact. Equal  strength.      Emergency Department Course   ECG  ECG taken at 1902, ECG read at 1929  Normal sinus rhythm.  Septal infarct, age undetermined.   No significant changes as compared to prior, dated 04/25/2020.  Rate 76 bpm. AK interval 154 ms. QRS duration 86 ms. QT/QTc 394/443 ms. P-R-T axes 64 24 56.     Emergency Department Course:     Reviewed:  I reviewed nursing notes, vitals, past medical history and Care Everywhere    Assessments:  1929 I obtained history and examined the patient as noted above.     Disposition:  The patient was discharged to home.     Impression & Plan     Medical Decision Making:  Patient comes in with some vague symptoms that just started.  She had some fullness in her ear and then pressure around her left maxillary sinus and behind her eye.  I saw a little bit of fluid in her left ear.  I think she is got some sinus congestion or maybe some allergies that are causing this discomfort.  She has a completely normal neurologic exam.  I do not find anything that would suggest a stroke.  She " does not have any proptosis or visual loss in that eye.  At this point I am going to recommend an antihistamine and she can follow-up in the clinic if her symptoms or not resolving this week.  I reassured her that I am not finding anything serious but if something changes she can always come back in.  An EKG was obtained when she arrived and it was normal.      I think you have some sinus pressure and some mild fluid in your left ear.  Try either Claritin or Zyrtec 1 tablet once a day.  Tylenol as needed.  If this gets worse, follow-up with your doctor in the clinic.    Diagnosis:    ICD-10-CM    1. Non-recurrent acute serous otitis media of left ear  H65.02    2. Sinus pressure  J34.89        Discharge Medications:  Discharge Medication List as of 7/23/2022  7:49 PM          Scribe Disclosure:  I, Lucretia Hawthorne, am serving as a scribe at 7:29 PM on 7/23/2022 to document services personally performed by Maryann Varela MD based on my observations and the provider's statements to me.          Maryann Varela MD  07/23/22 [id number]

## 2023-03-28 ENCOUNTER — HOSPITAL ENCOUNTER (INPATIENT)
Facility: CLINIC | Age: 73
LOS: 1 days | Discharge: HOME | DRG: 552 | End: 2023-03-31
Attending: EMERGENCY MEDICINE | Admitting: INTERNAL MEDICINE
Payer: MEDICARE

## 2023-03-28 ENCOUNTER — APPOINTMENT (OUTPATIENT)
Dept: CT IMAGING | Facility: CLINIC | Age: 73
DRG: 552 | End: 2023-03-28
Attending: EMERGENCY MEDICINE
Payer: MEDICARE

## 2023-03-28 DIAGNOSIS — S32.009A CLOSED FRACTURE OF TRANSVERSE PROCESS OF LUMBAR VERTEBRA, INITIAL ENCOUNTER (H): ICD-10-CM

## 2023-03-28 DIAGNOSIS — M62.838 MUSCLE SPASM: ICD-10-CM
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
## 2023-03-28 LAB
ABO/RH(D): NORMAL
ALBUMIN SERPL BCG-MCNC: 4.3 G/DL (ref 3.5–5.2)
ALP SERPL-CCNC: 85 U/L (ref 35–104)
ALT SERPL W P-5'-P-CCNC: 21 U/L (ref 10–35)
ANION GAP SERPL CALCULATED.3IONS-SCNC: 13 MMOL/L (ref 7–15)
ANTIBODY SCREEN: NEGATIVE
AST SERPL W P-5'-P-CCNC: 25 U/L (ref 10–35)
BASOPHILS # BLD AUTO: 0 10E3/UL (ref 0–0.2)
BASOPHILS NFR BLD AUTO: 1 %
BILIRUB SERPL-MCNC: 0.3 MG/DL
BUN SERPL-MCNC: 24.9 MG/DL (ref 8–23)
CALCIUM SERPL-MCNC: 9.3 MG/DL (ref 8.8–10.2)
CHLORIDE SERPL-SCNC: 101 MMOL/L (ref 98–107)
CREAT SERPL-MCNC: 0.87 MG/DL (ref 0.51–0.95)
DEPRECATED HCO3 PLAS-SCNC: 26 MMOL/L (ref 22–29)
EOSINOPHIL # BLD AUTO: 0.1 10E3/UL (ref 0–0.7)
EOSINOPHIL NFR BLD AUTO: 1 %
ERYTHROCYTE [DISTWIDTH] IN BLOOD BY AUTOMATED COUNT: 13.6 % (ref 10–15)
GFR SERPL CREATININE-BSD FRML MDRD: 70 ML/MIN/1.73M2
GLUCOSE SERPL-MCNC: 106 MG/DL (ref 70–99)
HCT VFR BLD AUTO: 39.1 % (ref 35–47)
HGB BLD-MCNC: 12.9 G/DL (ref 11.7–15.7)
IMM GRANULOCYTES # BLD: 0 10E3/UL
IMM GRANULOCYTES NFR BLD: 0 %
INR PPP: 2.22 (ref 0.85–1.15)
LYMPHOCYTES # BLD AUTO: 2 10E3/UL (ref 0.8–5.3)
LYMPHOCYTES NFR BLD AUTO: 30 %
MCH RBC QN AUTO: 30.6 PG (ref 26.5–33)
MCHC RBC AUTO-ENTMCNC: 33 G/DL (ref 31.5–36.5)
MCV RBC AUTO: 93 FL (ref 78–100)
MONOCYTES # BLD AUTO: 0.5 10E3/UL (ref 0–1.3)
MONOCYTES NFR BLD AUTO: 8 %
NEUTROPHILS # BLD AUTO: 4.1 10E3/UL (ref 1.6–8.3)
NEUTROPHILS NFR BLD AUTO: 60 %
NRBC # BLD AUTO: 0 10E3/UL
NRBC BLD AUTO-RTO: 0 /100
PLATELET # BLD AUTO: 203 10E3/UL (ref 150–450)
POTASSIUM SERPL-SCNC: 3.7 MMOL/L (ref 3.4–5.3)
PROT SERPL-MCNC: 7 G/DL (ref 6.4–8.3)
RBC # BLD AUTO: 4.22 10E6/UL (ref 3.8–5.2)
SODIUM SERPL-SCNC: 140 MMOL/L (ref 136–145)
SPECIMEN EXPIRATION DATE: NORMAL
WBC # BLD AUTO: 6.8 10E3/UL (ref 4–11)

## 2023-03-28 PROCEDURE — 85610 PROTHROMBIN TIME: CPT | Performed by: EMERGENCY MEDICINE

## 2023-03-28 PROCEDURE — G1010 CDSM STANSON: HCPCS

## 2023-03-28 PROCEDURE — 250N000013 HC RX MED GY IP 250 OP 250 PS 637: Performed by: EMERGENCY MEDICINE

## 2023-03-28 PROCEDURE — 250N000009 HC RX 250: Performed by: EMERGENCY MEDICINE

## 2023-03-28 PROCEDURE — 99285 EMERGENCY DEPT VISIT HI MDM: CPT | Mod: 25

## 2023-03-28 PROCEDURE — 86850 RBC ANTIBODY SCREEN: CPT | Performed by: EMERGENCY MEDICINE

## 2023-03-28 PROCEDURE — 82310 ASSAY OF CALCIUM: CPT | Performed by: EMERGENCY MEDICINE

## 2023-03-28 PROCEDURE — 96374 THER/PROPH/DIAG INJ IV PUSH: CPT

## 2023-03-28 PROCEDURE — 80053 COMPREHEN METABOLIC PANEL: CPT | Performed by: EMERGENCY MEDICINE

## 2023-03-28 PROCEDURE — 85025 COMPLETE CBC W/AUTO DIFF WBC: CPT | Performed by: EMERGENCY MEDICINE

## 2023-03-28 PROCEDURE — 96375 TX/PRO/DX INJ NEW DRUG ADDON: CPT

## 2023-03-28 PROCEDURE — G0378 HOSPITAL OBSERVATION PER HR: HCPCS

## 2023-03-28 PROCEDURE — 250N000011 HC RX IP 250 OP 636: Performed by: EMERGENCY MEDICINE

## 2023-03-28 PROCEDURE — 96376 TX/PRO/DX INJ SAME DRUG ADON: CPT

## 2023-03-28 PROCEDURE — 36415 COLL VENOUS BLD VENIPUNCTURE: CPT | Performed by: EMERGENCY MEDICINE

## 2023-03-28 RX ORDER — ONDANSETRON 2 MG/ML
4 INJECTION INTRAMUSCULAR; INTRAVENOUS ONCE
Status: COMPLETED | OUTPATIENT
Start: 2023-03-28 | End: 2023-03-28

## 2023-03-28 RX ORDER — TIZANIDINE 2 MG/1
2 TABLET ORAL ONCE
Status: COMPLETED | OUTPATIENT
Start: 2023-03-28 | End: 2023-03-28

## 2023-03-28 RX ORDER — HYDROMORPHONE HYDROCHLORIDE 1 MG/ML
0.5 INJECTION, SOLUTION INTRAMUSCULAR; INTRAVENOUS; SUBCUTANEOUS ONCE
Status: COMPLETED | OUTPATIENT
Start: 2023-03-28 | End: 2023-03-28

## 2023-03-28 RX ORDER — OXYCODONE HYDROCHLORIDE 5 MG/1
5 TABLET ORAL ONCE
Status: DISCONTINUED | OUTPATIENT
Start: 2023-03-28 | End: 2023-03-28

## 2023-03-28 RX ORDER — LIDOCAINE 4 G/G
1 PATCH TOPICAL ONCE
Status: COMPLETED | OUTPATIENT
Start: 2023-03-28 | End: 2023-03-29

## 2023-03-28 RX ORDER — IOPAMIDOL 755 MG/ML
68 INJECTION, SOLUTION INTRAVASCULAR ONCE
Status: COMPLETED | OUTPATIENT
Start: 2023-03-28 | End: 2023-03-28

## 2023-03-28 RX ADMIN — HYDROMORPHONE HYDROCHLORIDE 0.5 MG: 1 INJECTION, SOLUTION INTRAMUSCULAR; INTRAVENOUS; SUBCUTANEOUS at 20:18

## 2023-03-28 RX ADMIN — ONDANSETRON 4 MG: 2 INJECTION INTRAMUSCULAR; INTRAVENOUS at 20:18

## 2023-03-28 RX ADMIN — TIZANIDINE 2 MG: 2 TABLET ORAL at 21:46

## 2023-03-28 RX ADMIN — LIDOCAINE 1 PATCH: 560 PATCH PERCUTANEOUS; TOPICAL; TRANSDERMAL at 20:18

## 2023-03-28 RX ADMIN — ACETAMINOPHEN AND CODEINE PHOSPHATE 2 TABLET: 300; 30 TABLET ORAL at 23:23

## 2023-03-28 RX ADMIN — HYDROMORPHONE HYDROCHLORIDE 0.5 MG: 1 INJECTION, SOLUTION INTRAMUSCULAR; INTRAVENOUS; SUBCUTANEOUS at 21:24

## 2023-03-28 RX ADMIN — SODIUM CHLORIDE 60 ML: 900 INJECTION INTRAVENOUS at 21:34

## 2023-03-28 RX ADMIN — IOPAMIDOL 68 ML: 755 INJECTION, SOLUTION INTRAVENOUS at 21:34

## 2023-03-28 ASSESSMENT — ENCOUNTER SYMPTOMS
NECK PAIN: 0
ARTHRALGIAS: 0
BACK PAIN: 1
ABDOMINAL PAIN: 0
MYALGIAS: 0

## 2023-03-28 ASSESSMENT — ACTIVITIES OF DAILY LIVING (ADL)
ADLS_ACUITY_SCORE: 35
ADLS_ACUITY_SCORE: 35

## 2023-03-29 LAB — INR PPP: 2.14 (ref 0.85–1.15)

## 2023-03-29 PROCEDURE — 99221 1ST HOSP IP/OBS SF/LOW 40: CPT | Mod: AI | Performed by: INTERNAL MEDICINE

## 2023-03-29 PROCEDURE — 99221 1ST HOSP IP/OBS SF/LOW 40: CPT | Performed by: NURSE PRACTITIONER

## 2023-03-29 PROCEDURE — 85610 PROTHROMBIN TIME: CPT | Performed by: INTERNAL MEDICINE

## 2023-03-29 PROCEDURE — G0378 HOSPITAL OBSERVATION PER HR: HCPCS

## 2023-03-29 PROCEDURE — 99221 1ST HOSP IP/OBS SF/LOW 40: CPT | Performed by: STUDENT IN AN ORGANIZED HEALTH CARE EDUCATION/TRAINING PROGRAM

## 2023-03-29 PROCEDURE — 250N000013 HC RX MED GY IP 250 OP 250 PS 637: Performed by: INTERNAL MEDICINE

## 2023-03-29 PROCEDURE — 250N000011 HC RX IP 250 OP 636: Performed by: INTERNAL MEDICINE

## 2023-03-29 PROCEDURE — 36415 COLL VENOUS BLD VENIPUNCTURE: CPT | Performed by: INTERNAL MEDICINE

## 2023-03-29 RX ORDER — NALOXONE HYDROCHLORIDE 0.4 MG/ML
0.2 INJECTION, SOLUTION INTRAMUSCULAR; INTRAVENOUS; SUBCUTANEOUS
Status: DISCONTINUED | OUTPATIENT
Start: 2023-03-29 | End: 2023-03-31 | Stop reason: HOSPADM

## 2023-03-29 RX ORDER — VENLAFAXINE HYDROCHLORIDE 75 MG/1
75 CAPSULE, EXTENDED RELEASE ORAL DAILY
COMMUNITY

## 2023-03-29 RX ORDER — TRIAMCINOLONE ACETONIDE 0.25 MG/G
OINTMENT TOPICAL 2 TIMES DAILY PRN
COMMUNITY

## 2023-03-29 RX ORDER — TRAZODONE HYDROCHLORIDE 50 MG/1
50 TABLET, FILM COATED ORAL AT BEDTIME
Status: ON HOLD | COMMUNITY
End: 2023-03-29

## 2023-03-29 RX ORDER — DIPHENHYDRAMINE HYDROCHLORIDE 50 MG/ML
25 INJECTION INTRAMUSCULAR; INTRAVENOUS EVERY 6 HOURS PRN
Status: DISCONTINUED | OUTPATIENT
Start: 2023-03-29 | End: 2023-03-31 | Stop reason: HOSPADM

## 2023-03-29 RX ORDER — WARFARIN SODIUM 5 MG/1
10 TABLET ORAL
Status: COMPLETED | OUTPATIENT
Start: 2023-03-29 | End: 2023-03-29

## 2023-03-29 RX ORDER — AMOXICILLIN 250 MG
1 CAPSULE ORAL 2 TIMES DAILY PRN
Status: DISCONTINUED | OUTPATIENT
Start: 2023-03-29 | End: 2023-03-31 | Stop reason: HOSPADM

## 2023-03-29 RX ORDER — ZOLPIDEM TARTRATE 5 MG/1
2.5 TABLET ORAL AT BEDTIME
Status: ON HOLD | COMMUNITY
End: 2023-03-29

## 2023-03-29 RX ORDER — LIDOCAINE 4 G/G
2 PATCH TOPICAL
Status: DISCONTINUED | OUTPATIENT
Start: 2023-03-29 | End: 2023-03-31 | Stop reason: HOSPADM

## 2023-03-29 RX ORDER — HYDROMORPHONE HYDROCHLORIDE 1 MG/ML
0.3 INJECTION, SOLUTION INTRAMUSCULAR; INTRAVENOUS; SUBCUTANEOUS
Status: DISCONTINUED | OUTPATIENT
Start: 2023-03-29 | End: 2023-03-31 | Stop reason: HOSPADM

## 2023-03-29 RX ORDER — HYDROMORPHONE HYDROCHLORIDE 1 MG/ML
0.5 INJECTION, SOLUTION INTRAMUSCULAR; INTRAVENOUS; SUBCUTANEOUS
Status: DISCONTINUED | OUTPATIENT
Start: 2023-03-29 | End: 2023-03-29

## 2023-03-29 RX ORDER — TIZANIDINE 2 MG/1
2-4 TABLET ORAL EVERY 6 HOURS PRN
Status: DISCONTINUED | OUTPATIENT
Start: 2023-03-29 | End: 2023-03-31 | Stop reason: HOSPADM

## 2023-03-29 RX ORDER — ACETAMINOPHEN 650 MG/1
650 SUPPOSITORY RECTAL EVERY 6 HOURS PRN
Status: DISCONTINUED | OUTPATIENT
Start: 2023-03-29 | End: 2023-03-31 | Stop reason: HOSPADM

## 2023-03-29 RX ORDER — NALOXONE HYDROCHLORIDE 0.4 MG/ML
0.4 INJECTION, SOLUTION INTRAMUSCULAR; INTRAVENOUS; SUBCUTANEOUS
Status: DISCONTINUED | OUTPATIENT
Start: 2023-03-29 | End: 2023-03-31 | Stop reason: HOSPADM

## 2023-03-29 RX ORDER — DIPHENHYDRAMINE HCL 25 MG
25 CAPSULE ORAL EVERY 6 HOURS PRN
Status: DISCONTINUED | OUTPATIENT
Start: 2023-03-29 | End: 2023-03-31 | Stop reason: HOSPADM

## 2023-03-29 RX ORDER — ONDANSETRON 2 MG/ML
4 INJECTION INTRAMUSCULAR; INTRAVENOUS EVERY 6 HOURS PRN
Status: DISCONTINUED | OUTPATIENT
Start: 2023-03-29 | End: 2023-03-31 | Stop reason: HOSPADM

## 2023-03-29 RX ORDER — ONDANSETRON 4 MG/1
4 TABLET, ORALLY DISINTEGRATING ORAL EVERY 6 HOURS PRN
Status: DISCONTINUED | OUTPATIENT
Start: 2023-03-29 | End: 2023-03-31 | Stop reason: HOSPADM

## 2023-03-29 RX ORDER — AMOXICILLIN 250 MG
2 CAPSULE ORAL 2 TIMES DAILY PRN
Status: DISCONTINUED | OUTPATIENT
Start: 2023-03-29 | End: 2023-03-31 | Stop reason: HOSPADM

## 2023-03-29 RX ORDER — ACETAMINOPHEN 325 MG/1
650 TABLET ORAL EVERY 6 HOURS PRN
Status: DISCONTINUED | OUTPATIENT
Start: 2023-03-29 | End: 2023-03-31 | Stop reason: HOSPADM

## 2023-03-29 RX ORDER — WARFARIN SODIUM 5 MG/1
TABLET ORAL SEE ADMIN INSTRUCTIONS
COMMUNITY

## 2023-03-29 RX ADMIN — ACETAMINOPHEN AND CODEINE PHOSPHATE 2 TABLET: 300; 30 TABLET ORAL at 06:46

## 2023-03-29 RX ADMIN — ACETAMINOPHEN AND CODEINE PHOSPHATE 2 TABLET: 300; 30 TABLET ORAL at 14:08

## 2023-03-29 RX ADMIN — TIZANIDINE 4 MG: 2 TABLET ORAL at 17:58

## 2023-03-29 RX ADMIN — TIZANIDINE 4 MG: 2 TABLET ORAL at 10:19

## 2023-03-29 RX ADMIN — LIDOCAINE 2 PATCH: 560 PATCH PERCUTANEOUS; TOPICAL; TRANSDERMAL at 22:12

## 2023-03-29 RX ADMIN — ACETAMINOPHEN AND CODEINE PHOSPHATE 2 TABLET: 300; 30 TABLET ORAL at 20:06

## 2023-03-29 RX ADMIN — DIPHENHYDRAMINE HYDROCHLORIDE 25 MG: 25 CAPSULE ORAL at 02:54

## 2023-03-29 RX ADMIN — WARFARIN SODIUM 10 MG: 5 TABLET ORAL at 17:58

## 2023-03-29 RX ADMIN — HYDROMORPHONE HYDROCHLORIDE 0.3 MG: 1 INJECTION, SOLUTION INTRAMUSCULAR; INTRAVENOUS; SUBCUTANEOUS at 19:28

## 2023-03-29 RX ADMIN — TIZANIDINE 4 MG: 2 TABLET ORAL at 01:31

## 2023-03-29 RX ADMIN — TIZANIDINE 4 MG: 2 TABLET ORAL at 23:53

## 2023-03-29 ASSESSMENT — ACTIVITIES OF DAILY LIVING (ADL)
ADLS_ACUITY_SCORE: 38
ADLS_ACUITY_SCORE: 38
ADLS_ACUITY_SCORE: 34
ADLS_ACUITY_SCORE: 38
ADLS_ACUITY_SCORE: 35
ADLS_ACUITY_SCORE: 38
ADLS_ACUITY_SCORE: 38

## 2023-03-29 NOTE — CONSULTS
"[facility]    Neurosurgery Consultation     Date of Admission:  3/28/2023  Date of Consult (When I saw the patient): 03/29/23    Assessment & Plan   Nadja Angulo is a 72 year old female with history of L4-S1 fusion in [place] in 2007 who was admitted on 3/28/2023 with back pain s/p fall. Neurosurgery was consulted for \"L1 transverse process fracture\".     Plan:  - No surgical intervention or brace needed at this time  - Continue pain control measures  - Follow up with our NSG clinic as needed   - NSG will sign off, please call with any questions or concerns    I have discussed the following assessment and plan with Dr. Hankins.     Radha Ray, CNP  [facility] [place] [address]  Tel [phone]  Pager [phone]    Code Status    Full Code    Reason for Consult   Reason for consult: I was asked by Dr. Valdez to evaluate this patient for \"R L1 transverse process fracture\".    Primary Care Physician   Patrice Spoke    Chief Complaint   Fall, back pain     History is obtained from the patient and electronic health record    History of Present Illness   Nadja Angulo is a 72 year old female with history of  L4-S1 fusion in [place] in 2007 who was admitted on 3/28/2023 with back pain s/p fall. Patient reports she was walking downstairs when she slipped and fell backwards, landing on her right side. She presented to the ED for evaluation. Imaging showed a nondisplaced fracture of the right L1 transverse process. Patient reports low back pain and muscle spasms. Denies radicular pain, numbness, weakness.     EXAM: CT THORACIC SPINE W/O CONTRAST, CT LUMBAR SPINE W/O CONTRAST  LOCATION: [facility]  DATE/TIME: 3/28/2023 10:02 PM  INDICATION: Traumatic injury. Fall down stairs.  COMPARISON: Lumbar spine CT dated 2/26/2020  TECHNIQUE:  1) Dedicated axial, sagittal, and coronal images of " the Thoracic Spine were generated utilizing CT chest source data and are separately reviewed. Dose reduction techniques were used.   2) Dedicated axial, sagittal, and coronal images of the Lumbar Spine were generated utilizing CT abdomen pelvis source data and are separately reviewed. Dose reduction techniques were used.     FINDINGS  THORACIC SPINE CT:  VERTEBRA: Accentuation of the thoracic spine kyphosis. Right apex curvature centered in the midthoracic spine and left apex curvature of the lower thoracic spine. There is chronic degenerative endplate change with sclerosis and subchondral cyst formation   in the thoracic spine with associated degenerative vertebral body height loss. No evidence of an acute displaced fracture.   CANAL/FORAMINA: No high-grade osseous spinal canal stenosis. Scattered mild bilateral neural foraminal narrowing.  PARASPINAL: See separately dictated chest CT for intrathoracic findings.  LUMBAR SPINE CT:  VERTEBRA: Grade 1 retrolisthesis of L3 on L4. Posterior fusion from L4 through S1 with interbody disc spacers. No evidence of hardware fracture or loosening. There is mature osseous fusion across the vertebrae. Vertebral body heights are preserved. There   is a nondisplaced fracture of the right L1 transverse process.   CANAL/FORAMINA: Multilevel degenerative changes without high-grade osseous spinal canal stenosis. Bilateral neural foraminal narrowing is greatest at L3-L4, where there is at least moderate bilateral stenosis.  PARASPINAL: Separately dictated CT abdomen and pelvis for intra-abdominal and pelvic findings.                                                               IMPRESSION:  THORACIC SPINE CT:  1.  No evidence of an acute osseous abnormality of the thoracic spine.    LUMBAR SPINE CT:  1.  Nondisplaced fracture of the right L1 transverse process.   2.  Degenerative and postoperative changes, as described.    Past Medical History   I have reviewed this patient's medical  history and updated it with pertinent information if needed.   Past Medical History:   Diagnosis Date     Atrial fibrillation (H)      High cholesterol      TBI (traumatic brain injury)      Thyroid disease        Past Surgical History   I have reviewed this patient's surgical history and updated it with pertinent information if needed.  No past surgical history on file.    Prior to Admission Medications   Prior to Admission Medications   Prescriptions Last Dose Informant Patient Reported? Taking?   acetaminophen-codeine (TYLENOL #3) 300-30 MG tablet Past Month Other Yes Yes   Sig: Take 1 tablet by mouth daily as needed   levothyroxine (SYNTHROID/LEVOTHROID) 88 MCG tablet 3/28/2023 at 0800 Other Yes Yes   Sig: Take 88 mcg by mouth daily   triamcinolone (KENALOG) 0.025 % external ointment   Yes Yes   Sig: Apply topically 2 times daily as needed for irritation Apply to affected areas.   venlafaxine (EFFEXOR XR) 75 MG 24 hr capsule 3/28/2023 Other Yes Yes   Sig: Take 75 mg by mouth daily   warfarin ANTICOAGULANT (COUMADIN) 5 MG tablet 3/27/2023 at 1700 Other Yes Yes   Sig: Take by mouth See Admin Instructions 5 mg MWF and 10 mg the rest of the week.      Facility-Administered Medications: None     Allergies   Allergies   Allergen Reactions     Estrogens      Hmg-Coa-R Inhibitors Muscle Pain (Myalgia)     Hydrocodone        Social History   I have reviewed this patient's social history and updated it with pertinent information if needed. Nadja Angulo  reports that she has never smoked. She has never used smokeless tobacco. She reports that she does not currently use alcohol. She reports that she does not use drugs.    Family History   I have reviewed this patient's family history and updated it with pertinent information if needed.   No family history on file.    Review of Systems   10 point ROS negative other than symptoms noted in HPI.    Physical Exam   Temp: 98.1  F (36.7  C) Temp src: Oral BP: 104/62 Pulse: 77    "Resp: 16 SpO2: 97 % O2 Device: None (Room air)    Vital Signs with Ranges  Temp:  [97.8  F (36.6  C)-98.4  F (36.9  C)] 98.1  F (36.7  C)  Pulse:  [67-83] 77  Resp:  [16-18] 16  BP: (104-150)/(62-76) 104/62  SpO2:  [97 %-100 %] 97 %  135 lbs 0 oz     , Blood pressure 104/62, pulse 77, temperature 98.1  F (36.7  C), temperature source Oral, resp. rate 16, height 1.626 m (5' 4\"), weight 61.2 kg (135 lb), SpO2 97 %.  135 lbs 0 oz  HEENT:  Normocephalic, atraumatic  Heart:  No peripheral edema  Lungs:  No SOB  Skin:  Warm and dry, good capillary refill.    NEUROLOGICAL EXAMINATION:   Mental status:  Alert and Oriented x 3, speech is fluent.  Motor:   Iliopsoas  (hip flexion)               Right: 5/5  Left:  5/5  Quadriceps  (knee extension)       Right:  5/5  Left:  5/5  Hamstrings  (knee flexion)            Right:  5/5  Left:  5/5  Gastroc Soleus  (PF)                          Right:  5/5  Left:  5/5  Tibialis Ant  (DF)                          Right:  5/5  Left:  5/5  EHL                          Right:  5/5  Left:  5/5    Sensation:  Intact to light touch   Reflexes:   Negative Clonus    Lumbar examination reveals no tenderness of the spine.   Straight leg raise is negative bilaterally.       Data     CBC RESULTS:   Recent Labs   Lab Test 03/28/23 2011   WBC 6.8   RBC 4.22   HGB 12.9   HCT 39.1   MCV 93   MCH 30.6   MCHC 33.0   RDW 13.6        Basic Metabolic Panel:  Lab Results   Component Value Date     03/28/2023     04/25/2020      Lab Results   Component Value Date    POTASSIUM 3.7 03/28/2023    POTASSIUM 3.9 04/25/2020     Lab Results   Component Value Date    CHLORIDE 101 03/28/2023    CHLORIDE 105 04/25/2020     Lab Results   Component Value Date    ZACHARIAH 9.3 03/28/2023    ZACHARIAH 9.2 04/25/2020     Lab Results   Component Value Date    CO2 26 03/28/2023    CO2 28 04/25/2020     Lab Results   Component Value Date    BUN 24.9 03/28/2023    BUN 18 04/25/2020     Lab Results   Component Value Date "    CR 0.87 03/28/2023    CR 0.75 04/25/2020     Lab Results   Component Value Date     03/28/2023    GLC 91 04/25/2020     INR:  Lab Results   Component Value Date    INR 2.14 03/29/2023    INR 2.22 03/28/2023

## 2023-03-29 NOTE — H&P
"[facility]    History and Physical - Hospitalist Service       Date of Admission:  3/28/2023    Assessment & Plan      Nadja Angulo is a 72 year old female admitted on 3/28/2023. She presents with pain after a fall    Mechanical fall  R L1 transverse process fracture  On warfarin. Slipped and fell backwards down staircase at home, \"bounced\" down stairs hitting head on multiple steps, no LOC. With c/o R sided back pain thoracic to lower. No other pain c/o. In ED AFVSS. Labs normal, hgb 12.9. CT head w/o acute process. CT c/a/p w/o acute findings. CT c/t/l spine w/ nondiplaced fx of R L1 transverse process.   - prn analgesics  - neurosurgery consult  - PT    Atrial fibrillation   S/p ablation. On warfarin  - pharmacy consult for warfarin    CHRISTIE  - resume CPAP at discharge    MDD  Anxiety  Insomnia   -resume effexor, trazodone with rec    Hypothyroidism  - resume levothyroxine with rec    Hx TBI 2007  Per chart review       Diet: Regular Diet Adult    DVT Prophylaxis: Warfarin  King Catheter: Not present  Lines: None     Cardiac Monitoring: None  Code Status: Full Code      Clinically Significant Risk Factors Present on Admission               # Drug Induced Coagulation Defect: home medication list includes an anticoagulant medication                 Disposition Plan      Expected Discharge Date: 03/29/2023                  Bull Valdez MD  Hospitalist Service  [facility]  Securely message with [employer] (more info)  Text page via [employer] Paging/Directory     ______________________________________________________________________    Chief Complaint   Fall with back pain    History is obtained from the patient, electronic health record and emergency department physician    History of Present Illness   Nadja Angulo is a 72 year old female who presents after a fall.  Patient and her  are currently doing remodeling in their home and living in the basement.  " Patient states she was walking downstairs when she slipped falling backwards.  She fell down a staircase.  She did have head trauma.  She also hit on her right flank with subsequent pain.  She she denied any lightheadedness or dizziness.  She had no chest pain or palpitations.  Currently her only pain is in her back.  Denies abdominal pain.  She has no current chest pain or shortness of breath.      Past Medical History    Past Medical History:   Diagnosis Date     Atrial fibrillation (H)      High cholesterol      TBI (traumatic brain injury)      Thyroid disease        Past Surgical History   No past surgical history on file.    Prior to Admission Medications   Prior to Admission Medications   Prescriptions Last Dose Informant Patient Reported? Taking?   acetaminophen-codeine (TYLENOL #3) 300-30 MG tablet   Yes No   Sig: Take 1 tablet by mouth   apixaban ANTICOAGULANT (ELIQUIS ANTICOAGULANT) 5 MG tablet   Yes No   Sig: Take 5 mg by mouth   levothyroxine (SYNTHROID/LEVOTHROID) 88 MCG tablet   Yes No   Sig: Take 88 mcg by mouth   metoclopramide (REGLAN) 10 MG tablet   No No   Sig: Take 1 tablet (10 mg) by mouth 3 times daily as needed (N/V; Headache)   metoprolol succinate ER (TOPROL-XL) 25 MG 24 hr tablet   Yes No   rosuvastatin (CRESTOR) 10 MG tablet   Yes No   Sig: Take 10 mg by mouth   venlafaxine (EFFEXOR-XR) 150 MG 24 hr capsule   Yes No   Sig: Take 150 mg by mouth   zolpidem (AMBIEN) 10 MG tablet   Yes No   Sig: Take 10 mg by mouth      Facility-Administered Medications: None        Review of Systems    The 10 point Review of Systems is negative other than noted in the HPI or here.      Physical Exam   Vital Signs: Temp: 98.4  F (36.9  C) Temp src: Oral BP: 131/71 Pulse: 80   Resp: 18 SpO2: 100 %      Weight: 135 lbs 0 oz    General Appearance: Alert, very pleasant, no distress  Respiratory: CTA B  Cardiovascular: RRR. No murmur. No edema  GI: soft, nt/nd  Skin: no rashes or lesions grossly  Other: CN intact  grossly, ALCOCER     Medical Decision Making       50 MINUTES SPENT BY ME on the date of service doing chart review, history, exam, documentation & further activities per the note.      Data     I have personally reviewed the following data over the past 24 hrs:    6.8  \   12.9   / 203     140 101 24.9 (H) /  106 (H)   3.7 26 0.87 \       ALT: 21 AST: 25 AP: 85 TBILI: 0.3   ALB: 4.3 TOT PROTEIN: 7.0 LIPASE: N/A       INR:  2.22 (H) PTT:  N/A   D-dimer:  N/A Fibrinogen:  N/A       Imaging results reviewed over the past 24 hrs:   Recent Results (from the past 24 hour(s))   Head CT w/o contrast    Narrative    EXAM: CT HEAD W/O CONTRAST  LOCATION: [facility]  DATE/TIME: 3/28/2023 9:57 PM    INDICATION: Traumatic injury. Fall down stairs. Anticoagulated.  COMPARISON: 04/25/2020  TECHNIQUE: Routine CT Head without IV contrast. Multiplanar reformats. Dose reduction techniques were used.    FINDINGS:  INTRACRANIAL CONTENTS: No intracranial hemorrhage, extraaxial collection, or mass effect.  No CT evidence of acute infarct. Normal parenchymal attenuation. Normal ventricles and sulci.     VISUALIZED ORBITS/SINUSES/MASTOIDS: Prior bilateral cataract surgery. Visualized portions of the orbits are otherwise unremarkable. No paranasal sinus mucosal disease. No middle ear or mastoid effusion.    BONES/SOFT TISSUES: No acute abnormality.      Impression    IMPRESSION:  1.  No acute intracranial process.   CT Chest/Abdomen/Pelvis w Contrast    Narrative    EXAM: CT CHEST/ABDOMEN/PELVIS W CONTRAST  LOCATION: [facility]  DATE/TIME: 3/28/2023 9:59 PM    INDICATION: Right flank pain after fall down stairs.  COMPARISON: None.  TECHNIQUE: CT scan of the chest, abdomen, and pelvis was performed following injection of IV contrast. Multiplanar reformats were obtained. Dose reduction techniques were used.   CONTRAST: 68 mL Isovue 370    FINDINGS:   LUNGS AND PLEURA:  Normal.    MEDIASTINUM/AXILLAE: Normal.    CORONARY ARTERY CALCIFICATION: Mild.    HEPATOBILIARY: Normal.    PANCREAS: Normal.    SPLEEN: Normal.    ADRENAL GLANDS: Normal.    KIDNEYS/BLADDER: Normal.    BOWEL: Normal.    LYMPH NODES: Normal.    VASCULATURE: Unremarkable.    PELVIC ORGANS: Normal.    MUSCULOSKELETAL: Surgical changes lumbar spine. Scoliosis.      Impression    IMPRESSION:  1.  No acute change.   Lumbar spine CT w/o contrast    Narrative    EXAM: CT THORACIC SPINE W/O CONTRAST, CT LUMBAR SPINE W/O CONTRAST  LOCATION: [facility]  DATE/TIME: 3/28/2023 10:02 PM    INDICATION: Traumatic injury. Fall down stairs.  COMPARISON: Lumbar spine CT dated 2/26/2020  TECHNIQUE:  1) Dedicated axial, sagittal, and coronal images of the Thoracic Spine were generated utilizing CT chest source data and are separately reviewed. Dose reduction techniques were used.   2) Dedicated axial, sagittal, and coronal images of the Lumbar Spine were generated utilizing CT abdomen pelvis source data and are separately reviewed. Dose reduction techniques were used.     FINDINGS:    THORACIC SPINE CT:  VERTEBRA: Accentuation of the thoracic spine kyphosis. Right apex curvature centered in the midthoracic spine and left apex curvature of the lower thoracic spine. There is chronic degenerative endplate change with sclerosis and subchondral cyst formation   in the thoracic spine with associated degenerative vertebral body height loss. No evidence of an acute displaced fracture.     CANAL/FORAMINA: No high-grade osseous spinal canal stenosis. Scattered mild bilateral neural foraminal narrowing.    PARASPINAL: See separately dictated chest CT for intrathoracic findings.    LUMBAR SPINE CT:  VERTEBRA: Grade 1 retrolisthesis of L3 on L4. Posterior fusion from L4 through S1 with interbody disc spacers. No evidence of hardware fracture or loosening. There is mature osseous fusion across the vertebrae. Vertebral body  heights are preserved. There   is a nondisplaced fracture of the right L1 transverse process.     CANAL/FORAMINA: Multilevel degenerative changes without high-grade osseous spinal canal stenosis. Bilateral neural foraminal narrowing is greatest at L3-L4, where there is at least moderate bilateral stenosis.    PARASPINAL: Separately dictated CT abdomen and pelvis for intra-abdominal and pelvic findings.      Impression    IMPRESSION:  THORACIC SPINE CT:  1.  No evidence of an acute osseous abnormality of the thoracic spine.    LUMBAR SPINE CT:  1.  Nondisplaced fracture of the right L1 transverse process.   2.  Degenerative and postoperative changes, as described.     CT Thoracic Spine w/o Contrast    Narrative    EXAM: CT THORACIC SPINE W/O CONTRAST, CT LUMBAR SPINE W/O CONTRAST  LOCATION: [facility]  DATE/TIME: 3/28/2023 10:02 PM    INDICATION: Traumatic injury. Fall down stairs.  COMPARISON: Lumbar spine CT dated 2/26/2020  TECHNIQUE:  1) Dedicated axial, sagittal, and coronal images of the Thoracic Spine were generated utilizing CT chest source data and are separately reviewed. Dose reduction techniques were used.   2) Dedicated axial, sagittal, and coronal images of the Lumbar Spine were generated utilizing CT abdomen pelvis source data and are separately reviewed. Dose reduction techniques were used.     FINDINGS:    THORACIC SPINE CT:  VERTEBRA: Accentuation of the thoracic spine kyphosis. Right apex curvature centered in the midthoracic spine and left apex curvature of the lower thoracic spine. There is chronic degenerative endplate change with sclerosis and subchondral cyst formation   in the thoracic spine with associated degenerative vertebral body height loss. No evidence of an acute displaced fracture.     CANAL/FORAMINA: No high-grade osseous spinal canal stenosis. Scattered mild bilateral neural foraminal narrowing.    PARASPINAL: See separately dictated chest CT for  intrathoracic findings.    LUMBAR SPINE CT:  VERTEBRA: Grade 1 retrolisthesis of L3 on L4. Posterior fusion from L4 through S1 with interbody disc spacers. No evidence of hardware fracture or loosening. There is mature osseous fusion across the vertebrae. Vertebral body heights are preserved. There   is a nondisplaced fracture of the right L1 transverse process.     CANAL/FORAMINA: Multilevel degenerative changes without high-grade osseous spinal canal stenosis. Bilateral neural foraminal narrowing is greatest at L3-L4, where there is at least moderate bilateral stenosis.    PARASPINAL: Separately dictated CT abdomen and pelvis for intra-abdominal and pelvic findings.      Impression    IMPRESSION:  THORACIC SPINE CT:  1.  No evidence of an acute osseous abnormality of the thoracic spine.    LUMBAR SPINE CT:  1.  Nondisplaced fracture of the right L1 transverse process.   2.  Degenerative and postoperative changes, as described.

## 2023-03-29 NOTE — ED PROVIDER NOTES
"Or other portion of the neck.  History     Chief Complaint:  Fall       HPI   Nadja Angulo is a 72 year old female on Coumadin with a history of cervical fusion and TBI who presents via EMS following a fall. Patient reportedly slipped and fell backwards down a staircase in her home. She \"bounced\" down the stairs, hitting the back of her head on multiple steps as she fell. She did not lose consciousness. EMS was called to the scene. They gave her fentanyl en route and placed a c-collar. In the ED, she complains of right-sided back pain, ranging from mid-thoracic to the lower back, and muscle spasms in this area. Denies neck pain, headache, arm pain, hip pain, leg pain, abdominal pain, or chest pain.     Independent Historian:   None - Patient Only    Review of External Notes:       ROS:  Review of Systems   Cardiovascular: Negative for chest pain.   Gastrointestinal: Negative for abdominal pain.   Musculoskeletal: Positive for back pain. Negative for arthralgias (Hip pain), myalgias (arm pain, leg pain) and neck pain.   All other systems reviewed and are negative.    Allergies:  Estrogens  Hmg-Coa-R Inhibitors  Hydrocodone     Medications:    Coumadin   Levothyroxine  Venlafaxine   Vitamin D3   Diphenoxylate-atropine   Trazodone     Past Medical History:    Paroxysmal Atrial fibrillation  Hypercholesteremia   TBI   Thyroid disease   IBS   Osteoporosis   Depression   Anxiety   Hyperlipidemia   Nightmare disorder  Post-laminectomy syndrome   Somnambulism   DDD  Cervicalgia  CHRISTIE   Knee osteoarthritis     Past Surgical History:    Cardiac electrophysiology study and ablation   Cataract extraction  Cervical fusion   Left knee arthroplasty   Right knee surgery   Lumbar discectomy   Right wrist fracture   Tooth extraction    Family History:    Brother - gout   Father - prostate cancer, hypertension, osteoarthritis, stroke  Mother - CAD, depression, MI, hypothyroidism, osteoarthritis    Social History:  The patient presents " "to the ED with her  via EMS.   PCP: Patrice Robbins     Physical Exam     Patient Vitals for the past 24 hrs:   BP Temp Temp src Pulse Resp SpO2 Height Weight   03/28/23 2145 (!) 150/75 -- -- 83 -- 100 % -- --   03/28/23 2006 131/76 98  F (36.7  C) Oral 82 16 99 % 1.626 m (5' 4\") 61.2 kg (135 lb)        Physical Exam  Constitutional: Well appearing.  HEENT: Atraumatic.  PERRL.  EOMI.  Moist mucous membranes.  Neck: Soft.  Supple.  No tenderness to palpation of the midline  Cardiac: Regular rate and rhythm.  No murmur or rub.  Respiratory: Clear to auscultation bilaterally.  No respiratory distress.   Abdomen: Soft and nontender.  No rebound or guarding.  Nondistended.  Musculoskeletal: Mild tenderness in the right lumbar paraspinal muscle area with no visible abnormality, swelling, or bruising.  No tenderness over the midline spine.  Neurologic: Alert and oriented x3.  Normal tone and bulk.  No facial drooping.  Normal speech.  5/5 strength in bilateral upper and lower extremities.  Sensation to light touch intact throughout.  Normal gait.  Skin: No rashes.  No edema.  Psych: Normal affect.  Normal behavior.        Emergency Department Course     Imaging:  CT Thoracic Spine w/o Contrast   Final Result   IMPRESSION:   THORACIC SPINE CT:   1.  No evidence of an acute osseous abnormality of the thoracic spine.      LUMBAR SPINE CT:   1.  Nondisplaced fracture of the right L1 transverse process.    2.  Degenerative and postoperative changes, as described.         Lumbar spine CT w/o contrast   Final Result   IMPRESSION:   THORACIC SPINE CT:   1.  No evidence of an acute osseous abnormality of the thoracic spine.      LUMBAR SPINE CT:   1.  Nondisplaced fracture of the right L1 transverse process.    2.  Degenerative and postoperative changes, as described.         CT Chest/Abdomen/Pelvis w Contrast   Preliminary Result   IMPRESSION:   1.  No acute change.      Head CT w/o contrast   Final Result   IMPRESSION: "   1.  No acute intracranial process.         Report per radiology    Laboratory:  Labs Ordered and Resulted from Time of ED Arrival to Time of ED Departure   COMPREHENSIVE METABOLIC PANEL - Abnormal       Result Value    Sodium 140      Potassium 3.7      Chloride 101      Carbon Dioxide (CO2) 26      Anion Gap 13      Urea Nitrogen 24.9 (*)     Creatinine 0.87      Calcium 9.3      Glucose 106 (*)     Alkaline Phosphatase 85      AST 25      ALT 21      Protein Total 7.0      Albumin 4.3      Bilirubin Total 0.3      GFR Estimate 70     INR - Abnormal    INR 2.22 (*)    CBC WITH PLATELETS AND DIFFERENTIAL    WBC Count 6.8      RBC Count 4.22      Hemoglobin 12.9      Hematocrit 39.1      MCV 93      MCH 30.6      MCHC 33.0      RDW 13.6      Platelet Count 203      % Neutrophils 60      % Lymphocytes 30      % Monocytes 8      % Eosinophils 1      % Basophils 1      % Immature Granulocytes 0      NRBCs per 100 WBC 0      Absolute Neutrophils 4.1      Absolute Lymphocytes 2.0      Absolute Monocytes 0.5      Absolute Eosinophils 0.1      Absolute Basophils 0.0      Absolute Immature Granulocytes 0.0      Absolute NRBCs 0.0     TYPE AND SCREEN, ADULT    ABO/RH(D) O NEG      Antibody Screen Negative      SPECIMEN EXPIRATION DATE [id number]     ABO/RH TYPE AND SCREEN        Emergency Department Course & Assessments:       Interventions:  Medications   Lidocaine (LIDOCARE) 4 % Patch 1 patch (1 patch Transdermal $Patch/Med Applied 3/28/23 2018)     And   lidocaine patch in PLACE (has no administration in time range)   HYDROmorphone (PF) (DILAUDID) injection 0.5 mg (0.5 mg Intravenous $Given 3/28/23 2018)   ondansetron (ZOFRAN) injection 4 mg (4 mg Intravenous $Given 3/28/23 2018)   tiZANidine (ZANAFLEX) tablet 2 mg (2 mg Oral $Given 3/28/23 2146)   HYDROmorphone (PF) (DILAUDID) injection 0.5 mg (0.5 mg Intravenous $Given 3/28/23 2124)   iopamidol (ISOVUE-370) solution 68 mL (68 mLs Intravenous $Given 3/28/23 2134)    Saline Flush (60 mLs Intravenous $Given 3/28/23 2134)        Assessments:  1958 I obtained history and examined patient as noted above.   2238 I reassessed patient and explained findings.     Independent Interpretation (X-rays, CTs, rhythm strip):  CT head without acute obvious head bleed    Consultations/Discussion of Management or Tests:  2255 I consulted with Dr. Valdez, hospitalist, regarding the patient's history and presentation here in the emergency department who accepted the patient for admission.    Social Determinants of Health affecting care:   None    Disposition:  The patient was admitted to the hospital under the care of Dr. Valdez.     Impression & Plan      Medical Decision Making:  Nadja Angulo is a 72 old woman who is afebrile and hemodynamically stable.  Differential diagnosis includes muscle spasm versus thoracic and lumbar spine injury versus solid organ injury versus head injury versus other.  CT scan of the head revealed no acute intracranial abnormality.  She has no neck pain that require imaging.  CT scan of the chest, abdomen, and pelvis demonstrated no solid organ injury or other chest or intra-abdominal or pelvic traumatic injury.  CT of the spine demonstrates a nondisplaced L1 right transverse process fracture.  She is having pain and muscle spasms and was given the above invention with improvement, however, still feels unsafe discharging home due to safety concerns.  We discussed an observation admission and she is in agreement.  I spoke with the hospitalist service who accepts her to the observation unit and she is in stable condition at time of admission.    Diagnosis:    ICD-10-CM    1. Closed fracture of transverse process of lumbar vertebra, initial encounter (H)  S32.009A       2. Muscle spasm  M62.838       3. Fall, initial encounter  W19.XXXA              Scribe Disclosure:  JAXSON, Dayan Dumont, am serving as a scribe at 7:57 PM on 3/28/2023 to document services  personally performed by Yvan Matthew MD based on my observations and the provider's statements to me.   3/28/2023   Yvan Matthew MD Salay, Nicholas J, MD  03/29/23 0124

## 2023-03-29 NOTE — PHARMACY-ADMISSION MEDICATION HISTORY
Pharmacy Medication History  Admission medication history interview status for the 3/28/2023  admission is complete. See EPIC admission navigator for prior to admission medications     Location of Interview: Patient room  Medication history sources: Patient and Surescripts    Significant changes made to the medication list:  Removed Eliquis, metoclopramide, metoprolol succinate, rosuvastatin, Ambien, trazodone.  Change dose of Venlafaxine ER from 150 mg to 75 mg.   Added Warfarin 5 mg tablets, dosing 5 mg MWF and 10 mg the rest of the week.     In the past week, patient estimated taking medication this percent of the time: greater than 90%    Medication Affordability:  Not including over the counter (OTC) medications, was there a time in the past 12 months when you did not take your medications as prescribed because of cost?: No    Medication reconciliation completed by provider prior to medication history? No    Time spent in this activity: 30 minutes    Prior to Admission medications    Medication Sig Last Dose Taking? Auth Provider Long Term End Date   acetaminophen-codeine (TYLENOL #3) 300-30 MG tablet Take 1 tablet by mouth as needed.  Past Month Yes Reported, Patient     levothyroxine (SYNTHROID/LEVOTHROID) 88 MCG tablet Take 88 mcg by mouth daily. 3/28/2023 at 0800 Yes Reported, Patient Yes    triamcinolone (KENALOG) 0.025 % external ointment Apply topically 2 times daily as needed for irritation Apply to affected areas.  Yes Unknown, Entered By History     venlafaxine (EFFEXOR XR) 75 MG 24 hr capsule Take 75 mg by mouth daily 3/28/2023 Yes Unknown, Entered By History No    warfarin ANTICOAGULANT (COUMADIN) 5 MG tablet Take 5 mg MWF and 10 mg the rest of the week. 3/27/2023 at 1700 Yes Unknown, Entered By History         The information provided in this note is only as accurate as the sources available at the time of update(s)

## 2023-03-29 NOTE — CONSULTS
[facility]   Trauma Surgical Consultation           Assessment and Plan:   Assessment:   72 year old female who presents after falling backward down a staircase and hitting head multiple times. Patient is on warfarin for afib. Head CT, CT CAP are negative.     Acute traumatic injuries by imagin) Nondisplaced fracture of the right L1 transverse process.       Plan:     Tertiary exam is negative for any further findings. No further imaging required.   Patient struck cervical spine on stairs but denies any neck pain or new symptoms and on exam was benign. Ok to hold off on CT c spine for now.     Trauma surgery will sign off.       Consults: Neurosurgery consult for L1 TP fracture. Appreciate recs.                Chief Complaint:   Fall down stairs     History is obtained from the patient.         History of Present Illness:   This patient is a 72 year old  female who presented to the ED after falling down stairs. She reports that she slipped and fell down her flight of stairs.  She remembers hitting the back of her head on the right side of her body.      Negative loss of consciousnes.  EtOH use immediately prior to incident:  No  Illicit drug use immediately prior to incident:  No  Anticoagulation:  Yes - warfarin     History of syncope:  No  History of falls:  No  At baseline ambulates independently - yes.    Complains of right back/flank pain.  Its cramping and spasming.    Denies shortness of breath, chest pain, abdominal pain, nausea, emesis, dizziness, visual changes, headache, neck pain, back pain, extremity pain.               Past Medical History:    has a past medical history of Atrial fibrillation (H), High cholesterol, TBI (traumatic brain injury), and Thyroid disease.          Past Surgical History:   No past surgical history on file.            Social History:     Social History     Tobacco Use     Smoking status: Never     Smokeless tobacco: Never   Substance Use Topics      "Alcohol use: Not Currently             Family History:   Reviewed       Allergies:   All allergies reviewed and addressed          Medications:   No current facility-administered medications on file prior to encounter.  acetaminophen-codeine (TYLENOL #3) 300-30 MG tablet, Take 1 tablet by mouth daily as needed  levothyroxine (SYNTHROID/LEVOTHROID) 88 MCG tablet, Take 88 mcg by mouth daily  triamcinolone (KENALOG) 0.025 % external ointment, Apply topically 2 times daily as needed for irritation Apply to affected areas.  venlafaxine (EFFEXOR XR) 75 MG 24 hr capsule, Take 75 mg by mouth daily  warfarin ANTICOAGULANT (COUMADIN) 5 MG tablet, Take by mouth See Admin Instructions 5 mg MWF and 10 mg the rest of the week.        lidocaine   Transdermal Q8H ARTEM     Warfarin Therapy Reminder  1 each Oral See Admin Instructions            Review of Systems:   The 10 point review of systems is negative other than noted in the HPI.           Physical Exam:   /65 (BP Location: Right arm)   Pulse 67   Temp 97.8  F (36.6  C) (Oral)   Resp 16   Ht 1.626 m (5' 4\")   Wt 61.2 kg (135 lb)   SpO2 98%   BMI 23.17 kg/m    General appearance: well-nourished, no apparent distress  HEENT: Pupils are equal and round.  Head is normocephalic and atraumatic.  Neck is without thyromegaly, masses, swelling, ecchymosis.  Posterior cervical midline scar, well healed. No TTP.  Chest: Breathing comfortably on room air. Heart with regular rate and rhythm, no JVD.  No palpable swelling, masses, ecchymosis, no crepitus, no visible deformity.  Abdomen:  Nondistended, soft, nontender to palpation.  No masses.  Back: no palpable masses or visible deformity.  No TTP. No bruising.  Extremities: Strength equal and bilateral in BUE and BLE. No TTP. No swelling.   Neurologic: nonfocal, grossly intact times four extremities, alert and oriented times three.  Cranial nerves II through XII intact grossly.  Psychiatric: mood and affect are " appropriate.  Skin: without jaundice, lesions, rashes.  No abrasions or Ecchymosis noted.           Data:   WBC -   WBC   Date Value Ref Range Status   04/25/2020 5.3 4.0 - 11.0 10e9/L Final     WBC Count   Date Value Ref Range Status   03/28/2023 6.8 4.0 - 11.0 10e3/uL Final   ], HgB -   Hemoglobin   Date Value Ref Range Status   03/28/2023 12.9 11.7 - 15.7 g/dL Final   04/25/2020 14.1 11.7 - 15.7 g/dL Final   ]   Liver Function Studies -   Recent Labs   Lab Test 03/28/23 2011   PROTTOTAL 7.0   ALBUMIN 4.3   BILITOTAL 0.3   ALKPHOS 85   AST 25   ALT 21         IMAGING:  Results for orders placed or performed during the hospital encounter of 03/28/23   CT Chest/Abdomen/Pelvis w Contrast    Narrative    EXAM: CT CHEST/ABDOMEN/PELVIS W CONTRAST  LOCATION: [facility]  DATE/TIME: 3/28/2023 9:59 PM    INDICATION: Right flank pain after fall down stairs.  COMPARISON: None.  TECHNIQUE: CT scan of the chest, abdomen, and pelvis was performed following injection of IV contrast. Multiplanar reformats were obtained. Dose reduction techniques were used.   CONTRAST: 68 mL Isovue 370    FINDINGS:   LUNGS AND PLEURA: Normal.    MEDIASTINUM/AXILLAE: Normal.    CORONARY ARTERY CALCIFICATION: Mild.    HEPATOBILIARY: Normal.    PANCREAS: Normal.    SPLEEN: Normal.    ADRENAL GLANDS: Normal.    KIDNEYS/BLADDER: Normal.    BOWEL: Normal.    LYMPH NODES: Normal.    VASCULATURE: Unremarkable.    PELVIC ORGANS: Normal.    MUSCULOSKELETAL: Surgical changes lumbar spine. Scoliosis.      Impression    IMPRESSION:  1.  No acute change.   Head CT w/o contrast    Narrative    EXAM: CT HEAD W/O CONTRAST  LOCATION: [facility]  DATE/TIME: 3/28/2023 9:57 PM    INDICATION: Traumatic injury. Fall down stairs. Anticoagulated.  COMPARISON: 04/25/2020  TECHNIQUE: Routine CT Head without IV contrast. Multiplanar reformats. Dose reduction techniques were used.    FINDINGS:  INTRACRANIAL CONTENTS: No  intracranial hemorrhage, extraaxial collection, or mass effect.  No CT evidence of acute infarct. Normal parenchymal attenuation. Normal ventricles and sulci.     VISUALIZED ORBITS/SINUSES/MASTOIDS: Prior bilateral cataract surgery. Visualized portions of the orbits are otherwise unremarkable. No paranasal sinus mucosal disease. No middle ear or mastoid effusion.    BONES/SOFT TISSUES: No acute abnormality.      Impression    IMPRESSION:  1.  No acute intracranial process.   CT Thoracic Spine w/o Contrast    Narrative    EXAM: CT THORACIC SPINE W/O CONTRAST, CT LUMBAR SPINE W/O CONTRAST  LOCATION: [facility]  DATE/TIME: 3/28/2023 10:02 PM    INDICATION: Traumatic injury. Fall down stairs.  COMPARISON: Lumbar spine CT dated 2/26/2020  TECHNIQUE:  1) Dedicated axial, sagittal, and coronal images of the Thoracic Spine were generated utilizing CT chest source data and are separately reviewed. Dose reduction techniques were used.   2) Dedicated axial, sagittal, and coronal images of the Lumbar Spine were generated utilizing CT abdomen pelvis source data and are separately reviewed. Dose reduction techniques were used.     FINDINGS:    THORACIC SPINE CT:  VERTEBRA: Accentuation of the thoracic spine kyphosis. Right apex curvature centered in the midthoracic spine and left apex curvature of the lower thoracic spine. There is chronic degenerative endplate change with sclerosis and subchondral cyst formation   in the thoracic spine with associated degenerative vertebral body height loss. No evidence of an acute displaced fracture.     CANAL/FORAMINA: No high-grade osseous spinal canal stenosis. Scattered mild bilateral neural foraminal narrowing.    PARASPINAL: See separately dictated chest CT for intrathoracic findings.    LUMBAR SPINE CT:  VERTEBRA: Grade 1 retrolisthesis of L3 on L4. Posterior fusion from L4 through S1 with interbody disc spacers. No evidence of hardware fracture or loosening.  There is mature osseous fusion across the vertebrae. Vertebral body heights are preserved. There   is a nondisplaced fracture of the right L1 transverse process.     CANAL/FORAMINA: Multilevel degenerative changes without high-grade osseous spinal canal stenosis. Bilateral neural foraminal narrowing is greatest at L3-L4, where there is at least moderate bilateral stenosis.    PARASPINAL: Separately dictated CT abdomen and pelvis for intra-abdominal and pelvic findings.      Impression    IMPRESSION:  THORACIC SPINE CT:  1.  No evidence of an acute osseous abnormality of the thoracic spine.    LUMBAR SPINE CT:  1.  Nondisplaced fracture of the right L1 transverse process.   2.  Degenerative and postoperative changes, as described.     Lumbar spine CT w/o contrast    Narrative    EXAM: CT THORACIC SPINE W/O CONTRAST, CT LUMBAR SPINE W/O CONTRAST  LOCATION: [facility]  DATE/TIME: 3/28/2023 10:02 PM    INDICATION: Traumatic injury. Fall down stairs.  COMPARISON: Lumbar spine CT dated 2/26/2020  TECHNIQUE:  1) Dedicated axial, sagittal, and coronal images of the Thoracic Spine were generated utilizing CT chest source data and are separately reviewed. Dose reduction techniques were used.   2) Dedicated axial, sagittal, and coronal images of the Lumbar Spine were generated utilizing CT abdomen pelvis source data and are separately reviewed. Dose reduction techniques were used.     FINDINGS:    THORACIC SPINE CT:  VERTEBRA: Accentuation of the thoracic spine kyphosis. Right apex curvature centered in the midthoracic spine and left apex curvature of the lower thoracic spine. There is chronic degenerative endplate change with sclerosis and subchondral cyst formation   in the thoracic spine with associated degenerative vertebral body height loss. No evidence of an acute displaced fracture.     CANAL/FORAMINA: No high-grade osseous spinal canal stenosis. Scattered mild bilateral neural foraminal  narrowing.    PARASPINAL: See separately dictated chest CT for intrathoracic findings.    LUMBAR SPINE CT:  VERTEBRA: Grade 1 retrolisthesis of L3 on L4. Posterior fusion from L4 through S1 with interbody disc spacers. No evidence of hardware fracture or loosening. There is mature osseous fusion across the vertebrae. Vertebral body heights are preserved. There   is a nondisplaced fracture of the right L1 transverse process.     CANAL/FORAMINA: Multilevel degenerative changes without high-grade osseous spinal canal stenosis. Bilateral neural foraminal narrowing is greatest at L3-L4, where there is at least moderate bilateral stenosis.    PARASPINAL: Separately dictated CT abdomen and pelvis for intra-abdominal and pelvic findings.      Impression    IMPRESSION:  THORACIC SPINE CT:  1.  No evidence of an acute osseous abnormality of the thoracic spine.    LUMBAR SPINE CT:  1.  Nondisplaced fracture of the right L1 transverse process.   2.  Degenerative and postoperative changes, as described.         This note was created using voice recognition software. Undetected word substitutions or other errors may have occurred.     Zbigniew Franco MD    Time spent with the patient, reviewing the EMR, reviewing laboratory and imaging studies, more than 50% of which was counseling and coordinating care:  30 minutes.

## 2023-03-29 NOTE — ED TRIAGE NOTES
Patient slipped and fell down multiple stairs on her buttocks, hitting her head multiple times along the way, now complaining of lower back, neck and occiput pain. Patient has a history lumbar and cervical fusions and is on coumadin.

## 2023-03-29 NOTE — ED TRIAGE NOTES
Triage Assessment     Row Name 03/28/23 1950       Triage Assessment (Adult)    Airway WDL WDL       Respiratory WDL    Respiratory WDL WDL       Skin Circulation/Temperature WDL    Skin Circulation/Temperature WDL WDL       Cardiac WDL    Cardiac WDL X;rhythm     Pulse Rate & Regularity radial pulse irregular     Cardiac Rhythm Atrial fibrillation        Peripheral/Neurovascular WDL    Peripheral Neurovascular WDL WDL       Cognitive/Neuro/Behavioral WDL    Cognitive/Neuro/Behavioral WDL WDL

## 2023-03-29 NOTE — PLAN OF CARE
Goal Outcome Evaluation:         Patient arrived to unit shortly after midnight. Vitally stable, denies numbness/tingling. Pain controlled with PRN and scheduled medications. Up with standby assist. A/O x4, planning in progress.

## 2023-03-29 NOTE — PROGRESS NOTES
She was admitted early morning today, I visited with the patient in the room, we requested trauma evaluation for secondary and tertiary survey, patient mention falling from stairs at hitting her back, notes from trauma surgery reviewed, neurosurgery has been consulted for L1 fracture, pending input.  Pain controlled.  Patient did not have any urinary retention or numbness in the genital area.

## 2023-03-30 PROBLEM — S32.009A CLOSED FRACTURE OF TRANSVERSE PROCESS OF LUMBAR VERTEBRA, INITIAL ENCOUNTER (H): Status: ACTIVE | Noted: 2023-03-30
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
## 2023-03-30 PROBLEM — M62.838 MUSCLE SPASM: Status: ACTIVE | Noted: 2023-03-30

## 2023-03-30 LAB — INR PPP: 1.84 (ref 0.85–1.15)

## 2023-03-30 PROCEDURE — 99233 SBSQ HOSP IP/OBS HIGH 50: CPT | Performed by: INTERNAL MEDICINE

## 2023-03-30 PROCEDURE — G0378 HOSPITAL OBSERVATION PER HR: HCPCS

## 2023-03-30 PROCEDURE — 36415 COLL VENOUS BLD VENIPUNCTURE: CPT | Performed by: INTERNAL MEDICINE

## 2023-03-30 PROCEDURE — 96376 TX/PRO/DX INJ SAME DRUG ADON: CPT

## 2023-03-30 PROCEDURE — 250N000013 HC RX MED GY IP 250 OP 250 PS 637: Performed by: INTERNAL MEDICINE

## 2023-03-30 PROCEDURE — 999N000147 HC STATISTIC PT IP EVAL DEFER

## 2023-03-30 PROCEDURE — 250N000011 HC RX IP 250 OP 636: Performed by: INTERNAL MEDICINE

## 2023-03-30 PROCEDURE — 96375 TX/PRO/DX INJ NEW DRUG ADDON: CPT

## 2023-03-30 PROCEDURE — 120N000001 HC R&B MED SURG/OB

## 2023-03-30 PROCEDURE — 85610 PROTHROMBIN TIME: CPT | Performed by: INTERNAL MEDICINE

## 2023-03-30 RX ORDER — WARFARIN SODIUM 5 MG/1
10 TABLET ORAL
Status: COMPLETED | OUTPATIENT
Start: 2023-03-30 | End: 2023-03-30

## 2023-03-30 RX ORDER — LEVOTHYROXINE SODIUM 88 UG/1
88 TABLET ORAL DAILY
Status: DISCONTINUED | OUTPATIENT
Start: 2023-03-30 | End: 2023-03-31 | Stop reason: HOSPADM

## 2023-03-30 RX ORDER — METHOCARBAMOL 500 MG/1
500 TABLET, FILM COATED ORAL 4 TIMES DAILY
Status: DISCONTINUED | OUTPATIENT
Start: 2023-03-30 | End: 2023-03-31 | Stop reason: HOSPADM

## 2023-03-30 RX ORDER — DIAZEPAM 10 MG/2ML
2.5 INJECTION, SOLUTION INTRAMUSCULAR; INTRAVENOUS ONCE
Status: COMPLETED | OUTPATIENT
Start: 2023-03-30 | End: 2023-03-30

## 2023-03-30 RX ORDER — VENLAFAXINE HYDROCHLORIDE 75 MG/1
75 CAPSULE, EXTENDED RELEASE ORAL DAILY
Status: DISCONTINUED | OUTPATIENT
Start: 2023-03-30 | End: 2023-03-31 | Stop reason: HOSPADM

## 2023-03-30 RX ADMIN — DIAZEPAM 2.5 MG: 5 INJECTION INTRAMUSCULAR; INTRAVENOUS at 02:25

## 2023-03-30 RX ADMIN — METHOCARBAMOL 500 MG: 500 TABLET ORAL at 21:02

## 2023-03-30 RX ADMIN — ACETAMINOPHEN AND CODEINE PHOSPHATE 2 TABLET: 300; 30 TABLET ORAL at 09:01

## 2023-03-30 RX ADMIN — LEVOTHYROXINE SODIUM 88 MCG: 88 TABLET ORAL at 14:39

## 2023-03-30 RX ADMIN — ACETAMINOPHEN AND CODEINE PHOSPHATE 1 TABLET: 300; 30 TABLET ORAL at 18:58

## 2023-03-30 RX ADMIN — ACETAMINOPHEN AND CODEINE PHOSPHATE 2 TABLET: 300; 30 TABLET ORAL at 23:06

## 2023-03-30 RX ADMIN — VENLAFAXINE HYDROCHLORIDE 75 MG: 75 CAPSULE, EXTENDED RELEASE ORAL at 14:39

## 2023-03-30 RX ADMIN — WARFARIN SODIUM 10 MG: 5 TABLET ORAL at 17:40

## 2023-03-30 RX ADMIN — METHOCARBAMOL 500 MG: 500 TABLET ORAL at 10:02

## 2023-03-30 RX ADMIN — METHOCARBAMOL 500 MG: 500 TABLET ORAL at 13:06

## 2023-03-30 RX ADMIN — METHOCARBAMOL 500 MG: 500 TABLET ORAL at 17:40

## 2023-03-30 RX ADMIN — LIDOCAINE 2 PATCH: 560 PATCH PERCUTANEOUS; TOPICAL; TRANSDERMAL at 21:02

## 2023-03-30 ASSESSMENT — ACTIVITIES OF DAILY LIVING (ADL)
ADLS_ACUITY_SCORE: 33
ADLS_ACUITY_SCORE: 34
ADLS_ACUITY_SCORE: 33

## 2023-03-30 NOTE — PLAN OF CARE
Physical therapy:    Physical therapy orders received and acknowledged. Per chart and conversation with RN, pt has been up SBA/IND. This writer entered pt room to discuss PT orders/IP PT goals of care. Pt declines need for PT evaluation and education on safe mobility. Requests PT leaves handout on proper posture and body mechanics for independent review. Pt most concerned with pain control and medication management, encouraged to speak with appropriate providers about this, RN updated. Will complete orders and defer to care team for discharge recommendations.    Mariaa Salas, PT, DPT

## 2023-03-30 NOTE — PROGRESS NOTES
"[facility]    Hospitalist Progress Note    Assessment & Plan   Nadja Angulo is a 72 year old female admitted on 3/28/2023. She presents with pain after a fall     Mechanical fall  R L1 transverse process fracture  On warfarin. Slipped and fell backwards down staircase at home, \"bounced\" down stairs hitting head on multiple steps, no LOC. With c/o R sided back pain thoracic to lower. No other pain c/o. In ED AFVSS. Labs normal, hgb 12.9. CT head w/o acute process. CT c/a/p w/o acute findings. CT c/t/l spine w/ nondiplaced fx of R L1 transverse process.   -Neurology recommended nonoperative management  --Patient had significant spasms yesterday, started on scheduled Robaxin, as needed tizanidine, pain control with lidocaine patch, patient worked with PT, possibly can discharge home if spasms are better controlled by 3/31.     Atrial fibrillation   S/p ablation. On warfarin  -Pharmacy dosing warfarin     CHRISTIE  - resume CPAP at discharge     MDD  Anxiety  Insomnia   -resume effexor, trazodone      Hypothyroidism  - resume levothyroxine      Hx TBI 2007  Per chart review      DVT Prophylaxis: Low Risk/Ambulatory with no VTE prophylaxis indicated  Code Status: Full Code     52 MINUTES SPENT BY ME on the date of service doing chart review, history, exam, documentation & further activities per the note.  Disposition: Expected discharge possibly 3/31  Clinically Significant Risk Factors Present on Admission               # Drug Induced Coagulation Defect: home medication list includes an anticoagulant medication                 Rocio Hurley MD  Text Page   (7am to 6pm)    Interval History   Last present in back, she had spasms overnight, did not sleep well, ongoing spasms present but much better with Robaxin as scheduled.  Discussed about possible discharge tomorrow a.m. if spasms are better controlled.  Discussed plan from neurosurgery not to have any operative decisions now.    -Data reviewed " today: I reviewed all new labs and imaging results over the last 24 hours.    Physical Exam     Vital Signs with Ranges  Temp:  [97.3  F (36.3  C)-98.1  F (36.7  C)] 97.7  F (36.5  C)  Pulse:  [62-77] 62  Resp:  [15-18] 15  BP: (101-142)/(53-70) 142/70  SpO2:  [95 %-100 %] 100 %  No intake/output data recorded.    Constitutional: Awake, alert, cooperative, no apparent distress  Respiratory: Clear to auscultation bilaterally, no crackles or wheezing  Cardiovascular: Regular rate and rhythm, normal S1 and S2, and no murmur noted  GI: Normal bowel sounds, soft, non-distended, non-tender  Skin/Integumen: No rashes, no cyanosis, no edema  Neuro : moving all 4 extremities, no focal deficit noted     Medications       lidocaine  2 patch Transdermal Q24h    And     lidocaine   Transdermal Q8H ARTEM     methocarbamol  500 mg Oral 4x Daily     warfarin ANTICOAGULANT  10 mg Oral ONCE at 18:00     Warfarin Therapy Reminder  1 each Oral See Admin Instructions       Data   Recent Labs   Lab 03/30/23  0650 03/29/23  1235 03/28/23 2011   WBC  --   --  6.8   HGB  --   --  12.9   MCV  --   --  93   PLT  --   --  203   INR 1.84* 2.14* 2.22*   NA  --   --  140   POTASSIUM  --   --  3.7   CHLORIDE  --   --  101   CO2  --   --  26   BUN  --   --  24.9*   CR  --   --  0.87   ANIONGAP  --   --  13   ZACHARIAH  --   --  9.3   GLC  --   --  106*   ALBUMIN  --   --  4.3   PROTTOTAL  --   --  7.0   BILITOTAL  --   --  0.3   ALKPHOS  --   --  85   ALT  --   --  21   AST  --   --  25     Recent Labs   Lab 03/28/23 2011   *       Imaging:   No results found for this or any previous visit (from the past 24 hour(s)).

## 2023-03-30 NOTE — PLAN OF CARE
Goal Outcome Evaluation:         Patient was awake most of the night, finally was able to sleep after 0300. Pain was a challenge to manage throughout the night, continued to have intense muscle spasms. Service was contacted and ordered one time dose of Valium give at approximately 0240. Denies any numbness/ tingling, A/O x4, vitally stable, up independently. Discharge planning in progress.

## 2023-03-30 NOTE — UTILIZATION REVIEW
"Admission Status; Secondary Review Determination     Admission Date: 3/28/2023  7:48 PM       Under the authority of the Utilization Management Committee, the utilization review process indicated a secondary review on the above patient.  The review outcome is based on review of the medical records, discussions with staff, and applying clinical experience noted on the date of the review.        (x)      Inpatient Status Appropriate - This patient's medical care is consistent with medical management for inpatient care and reasonable inpatient medical practice.       RATIONALE FOR DETERMINATION      Brief clinical presentation, information copied from the chart, abbreviated and edited for relevant content:     Paged team to advance to IP.       Nadja Angulo is a 72 year old female admitted on 3/28/2023. She presented with pain after a fall, noted to have a R L1 transverse process fracture. On warfarin. Slipped and fell backwards down staircase at home, \"bounced\" down stairs hitting head on multiple steps, no LOC. With c/o R sided back pain thoracic to lower. . CT c/a/p w/o acute findings. CT c/t/l spine w/ nondiplaced fx of R L1 transverse process. Neurology recommended nonoperative management. Patient had significant spasms, started on scheduled Robaxin, as needed tizanidine, pain control with lidocaine patch, patient worked with PT, possibly can discharge home if spasms are better controlled by 3/31.       At the time of admission with the information available to the attending physician, more than 2 nights hospital complex care was anticipated. Also, there was a risk of adverse outcome if patient was treated outpatient or observation. High intensity of services anticipated. Inpatient admission appropriate based on Medicare guidelines.       The information on this document is developed by the utilization review team in order for the business office to ensure compliance.  This only denotes the appropriateness of " proper admission status and does not reflect the quality of care rendered.         The definitions of Inpatient Status and Observation Status used in making the determination above are those provided in the CMS Coverage Manual, Chapter 1 and Chapter 6, section 70.4.      Sincerely,      Jeny Hooker MD   Utilization Review/ Case Management  [facility].

## 2023-03-30 NOTE — PLAN OF CARE
A&O x4. VSS on RA. Patient is independent in room with steady gait. Denies any numbness or tingling. Pain was well managed with scheduled robaxin and PRN Tylenol #3 x2. Tolerates regular diet. Voiding in BR. Discharge pending.

## 2023-03-30 NOTE — PROGRESS NOTES
Shift Summary [id number]    Admitting Diagnosis: Muscle spasm [M62.838]  Fall, initial encounter [W19.XXXA]  Closed fracture of transverse process of lumbar vertebra, initial encounter (H) [S32.009A]   Vitals WNL.   Pain 3-8/10. Taking Tylenol -codeine PRN.   A&Ox4  Voiding : WNL.   Mobility: stand byu assist.   Tele NA.   CMS WNL.   Lung Sounds clear on room air.   GI : WNL.   Dressing NA.     Orders Placed This Encounter      Regular Diet Adult       Plan:   Non surgical intervention for lumbar fracture. F/up with NSG outpatient.

## 2023-03-31 VITALS
HEIGHT: 64 IN | SYSTOLIC BLOOD PRESSURE: 109 MMHG | DIASTOLIC BLOOD PRESSURE: 68 MMHG | WEIGHT: 135 LBS | OXYGEN SATURATION: 98 % | RESPIRATION RATE: 16 BRPM | HEART RATE: 75 BPM | TEMPERATURE: 98.3 F | BODY MASS INDEX: 23.05 KG/M2

## 2023-03-31 LAB — INR PPP: 2.2 (ref 0.85–1.15)

## 2023-03-31 PROCEDURE — 250N000013 HC RX MED GY IP 250 OP 250 PS 637: Performed by: INTERNAL MEDICINE

## 2023-03-31 PROCEDURE — 36415 COLL VENOUS BLD VENIPUNCTURE: CPT | Performed by: INTERNAL MEDICINE

## 2023-03-31 PROCEDURE — 85610 PROTHROMBIN TIME: CPT | Performed by: INTERNAL MEDICINE

## 2023-03-31 PROCEDURE — 99239 HOSP IP/OBS DSCHRG MGMT >30: CPT | Performed by: INTERNAL MEDICINE

## 2023-03-31 RX ORDER — METHOCARBAMOL 500 MG/1
500 TABLET, FILM COATED ORAL 4 TIMES DAILY
Qty: 56 TABLET | Refills: 0 | Status: SHIPPED | OUTPATIENT
Start: 2023-03-31 | End: 2023-04-14

## 2023-03-31 RX ORDER — DIAZEPAM 5 MG
5 TABLET ORAL EVERY 6 HOURS PRN
Qty: 20 TABLET | Refills: 0 | Status: SHIPPED | OUTPATIENT
Start: 2023-03-31

## 2023-03-31 RX ORDER — ACETAMINOPHEN 325 MG/1
650 TABLET ORAL EVERY 6 HOURS PRN
COMMUNITY
Start: 2023-03-31

## 2023-03-31 RX ORDER — WARFARIN SODIUM 5 MG/1
5 TABLET ORAL
Status: DISCONTINUED | OUTPATIENT
Start: 2023-03-31 | End: 2023-03-31 | Stop reason: HOSPADM

## 2023-03-31 RX ADMIN — VENLAFAXINE HYDROCHLORIDE 75 MG: 75 CAPSULE, EXTENDED RELEASE ORAL at 08:49

## 2023-03-31 RX ADMIN — SENNOSIDES AND DOCUSATE SODIUM 2 TABLET: 50; 8.6 TABLET ORAL at 08:58

## 2023-03-31 RX ADMIN — TIZANIDINE 4 MG: 2 TABLET ORAL at 02:11

## 2023-03-31 RX ADMIN — METHOCARBAMOL 500 MG: 500 TABLET ORAL at 08:49

## 2023-03-31 RX ADMIN — LEVOTHYROXINE SODIUM 88 MCG: 88 TABLET ORAL at 08:49

## 2023-03-31 RX ADMIN — ACETAMINOPHEN AND CODEINE PHOSPHATE 2 TABLET: 300; 30 TABLET ORAL at 03:47

## 2023-03-31 RX ADMIN — METHOCARBAMOL 500 MG: 500 TABLET ORAL at 13:17

## 2023-03-31 ASSESSMENT — ACTIVITIES OF DAILY LIVING (ADL)
ADLS_ACUITY_SCORE: 33

## 2023-03-31 NOTE — PROGRESS NOTES
Pt is alert and oriented x 4, VSS on RA, PIV SL. Pt is on a regular diet, voiding without difficulty. Pt  has been up most of the night. Pain was managed with Tylenol-codeine, Robaxin & tizanidine. Discharge pending pain management.. Pt refused for her labs to be  drawn.

## 2023-03-31 NOTE — DISCHARGE SUMMARY
"[facility]    Discharge Summary  Hospitalist    Date of Admission:  3/28/2023  Date of Discharge:  3/31/2023  Discharging Provider: Rocio Hurley MD    Discharge Diagnoses   Closed fracture of transverse process of lumbar vertebra    History of Present Illness   Please review admission history and physical.    Hospital Course   Nadja Angulo was admitted on 3/28/2023.  The following problems were addressed during her hospitalization:    Principal Problem:    Fall, initial encounter  Active Problems:    Muscle spasm    Closed fracture of transverse process of lumbar vertebra, initial encounter (H)  Nadja Angulo is a 72 year old female admitted on 3/28/2023. She presents with pain after a fall     Mechanical fall  R L1 transverse process fracture  On warfarin. Slipped and fell backwards down staircase at home, \"bounced\" down stairs hitting head on multiple steps, no LOC. With c/o R sided back pain thoracic to lower. No other pain c/o. In ED AFVSS. Labs normal, hgb 12.9. CT head w/o acute process. CT c/a/p w/o acute findings. CT c/t/l spine w/ nondiplaced fx of R L1 transverse process.   -Neurology recommended nonoperative management  --Patient had significant spasms yesterday, started on scheduled Robaxin, as needed tizanidine, pain control with lidocaine patch, patient worked with PT, possibly can discharge home if spasms are better controlled by 3/31.     Atrial fibrillation   S/p ablation. On warfarin  -Pharmacy dosing warfarin     CHRISTIE  - resume CPAP at discharge      Rocio Hurley MD    Significant Results and Procedures       Pending Results     Unresulted Labs Ordered in the Past 30 Days of this Admission     No orders found from 2/26/2023 to 3/29/2023.          Code Status   Full Code       Primary Care Physician   Patrice Robbins    Physical Exam   Temp: 98.3  F (36.8  C) Temp src: Oral BP: 109/68 Pulse: 75   Resp: 16 SpO2: 98 % O2 Device: None (Room air)    Vitals:    03/28/23 " 2006   Weight: 61.2 kg (135 lb)     Vital Signs with Ranges  Temp:  [97.9  F (36.6  C)-98.3  F (36.8  C)] 98.3  F (36.8  C)  Pulse:  [74-83] 75  Resp:  [14-16] 16  BP: (108-128)/(56-82) 109/68  SpO2:  [95 %-100 %] 98 %  No intake/output data recorded.    The patient was examined on the day of discharge.    Discharge Disposition   Discharged to home  Condition at discharge: Stable    Consultations This Hospital Stay   PHYSICAL THERAPY ADULT IP CONSULT  NEUROSURGERY IP CONSULT  PHARMACY TO DOSE WARFARIN  TRAUMA SURGERY IP CONSULT  PHYSICAL THERAPY ADULT IP CONSULT    Time Spent on this Encounter   I, Rocio Hurley MD, personally saw the patient today and spent greater than 30 minutes discharging this patient.    Discharge Orders      Physical Therapy Referral      Reason for your hospital stay    Fall with spinous process fracture     Follow-up and recommended labs and tests     Follow up with primary care provider, Patrice Robbins, within 7 days for hospital follow- up.  The following labs/tests are recommended: need follow up to evaluation  of spasm .     Activity    Your activity upon discharge: activity as tolerated     Diet    Follow this diet upon discharge: Orders Placed This Encounter      Regular Diet Adult     Discharge Medications   Current Discharge Medication List      START taking these medications    Details   acetaminophen (TYLENOL) 325 MG tablet Take 2 tablets (650 mg) by mouth every 6 hours as needed for mild pain or other (and adjunct with moderate or severe pain or per patient request)      diazepam (VALIUM) 5 MG tablet Take 1 tablet (5 mg) by mouth every 6 hours as needed for muscle spasms  Qty: 20 tablet, Refills: 0    Comments: Do not drive, high fall risk  Associated Diagnoses: Muscle spasm      methocarbamol (ROBAXIN) 500 MG tablet Take 1 tablet (500 mg) by mouth 4 times daily for 14 days  Qty: 56 tablet, Refills: 0    Associated Diagnoses: Muscle spasm; Fall, initial encounter          CONTINUE these medications which have NOT CHANGED    Details   acetaminophen-codeine (TYLENOL #3) 300-30 MG tablet Take 1 tablet by mouth daily as needed      levothyroxine (SYNTHROID/LEVOTHROID) 88 MCG tablet Take 88 mcg by mouth daily      triamcinolone (KENALOG) 0.025 % external ointment Apply topically 2 times daily as needed for irritation Apply to affected areas.      venlafaxine (EFFEXOR XR) 75 MG 24 hr capsule Take 75 mg by mouth daily      warfarin ANTICOAGULANT (COUMADIN) 5 MG tablet Take by mouth See Admin Instructions 5 mg MWF and 10 mg the rest of the week.           Allergies   Allergies   Allergen Reactions     Estrogens      Hmg-Coa-R Inhibitors Muscle Pain (Myalgia)     Hydrocodone      Data   Most Recent 3 CBC's:Recent Labs   Lab Test 03/28/23 2011 04/25/20 2039   WBC 6.8 5.3   HGB 12.9 14.1   MCV 93 91    234      Most Recent 3 BMP's:  Recent Labs   Lab Test 03/28/23 2011 04/25/20 2039    138   POTASSIUM 3.7 3.9   CHLORIDE 101 105   CO2 26 28   BUN 24.9* 18   CR 0.87 0.75   ANIONGAP 13 5   ZACHARIAH 9.3 9.2   * 91     Most Recent 2 LFT's:  Recent Labs   Lab Test 03/28/23 2011   AST 25   ALT 21   ALKPHOS 85   BILITOTAL 0.3     Most Recent INR's and Anticoagulation Dosing History:  Anticoagulation Dose History     Recent Dosing and Labs Latest Ref Rng & Units 3/28/2023 3/29/2023 3/30/2023    Warfarin 5 mg - - 10 mg 10 mg    INR 0.85 - 1.15 2.22(H) 2.14(H) 1.84(H)        Most Recent 3 Troponin's:No lab results found.  Most Recent Cholesterol Panel:No lab results found.  Most Recent 6 Bacteria Isolates From Any Culture (See EPIC Reports for Culture Details):No lab results found.  Most Recent TSH, T4 and A1c Labs:No lab results found.  Results for orders placed or performed during the hospital encounter of 03/28/23   CT Chest/Abdomen/Pelvis w Contrast    Narrative    EXAM: CT CHEST/ABDOMEN/PELVIS W CONTRAST  LOCATION: [facility]  DATE/TIME: 3/28/2023 9:59  PM    INDICATION: Right flank pain after fall down stairs.  COMPARISON: None.  TECHNIQUE: CT scan of the chest, abdomen, and pelvis was performed following injection of IV contrast. Multiplanar reformats were obtained. Dose reduction techniques were used.   CONTRAST: 68 mL Isovue 370    FINDINGS:   LUNGS AND PLEURA: Normal.    MEDIASTINUM/AXILLAE: Normal.    CORONARY ARTERY CALCIFICATION: Mild.    HEPATOBILIARY: Normal.    PANCREAS: Normal.    SPLEEN: Normal.    ADRENAL GLANDS: Normal.    KIDNEYS/BLADDER: Normal.    BOWEL: Normal.    LYMPH NODES: Normal.    VASCULATURE: Unremarkable.    PELVIC ORGANS: Normal.    MUSCULOSKELETAL: Surgical changes lumbar spine. Scoliosis.      Impression    IMPRESSION:  1.  No acute change.   Head CT w/o contrast    Narrative    EXAM: CT HEAD W/O CONTRAST  LOCATION: [facility]  DATE/TIME: 3/28/2023 9:57 PM    INDICATION: Traumatic injury. Fall down stairs. Anticoagulated.  COMPARISON: 04/25/2020  TECHNIQUE: Routine CT Head without IV contrast. Multiplanar reformats. Dose reduction techniques were used.    FINDINGS:  INTRACRANIAL CONTENTS: No intracranial hemorrhage, extraaxial collection, or mass effect.  No CT evidence of acute infarct. Normal parenchymal attenuation. Normal ventricles and sulci.     VISUALIZED ORBITS/SINUSES/MASTOIDS: Prior bilateral cataract surgery. Visualized portions of the orbits are otherwise unremarkable. No paranasal sinus mucosal disease. No middle ear or mastoid effusion.    BONES/SOFT TISSUES: No acute abnormality.      Impression    IMPRESSION:  1.  No acute intracranial process.   CT Thoracic Spine w/o Contrast    Narrative    EXAM: CT THORACIC SPINE W/O CONTRAST, CT LUMBAR SPINE W/O CONTRAST  LOCATION: [facility]  DATE/TIME: 3/28/2023 10:02 PM    INDICATION: Traumatic injury. Fall down stairs.  COMPARISON: Lumbar spine CT dated 2/26/2020  TECHNIQUE:  1) Dedicated axial, sagittal, and coronal images of  the Thoracic Spine were generated utilizing CT chest source data and are separately reviewed. Dose reduction techniques were used.   2) Dedicated axial, sagittal, and coronal images of the Lumbar Spine were generated utilizing CT abdomen pelvis source data and are separately reviewed. Dose reduction techniques were used.     FINDINGS:    THORACIC SPINE CT:  VERTEBRA: Accentuation of the thoracic spine kyphosis. Right apex curvature centered in the midthoracic spine and left apex curvature of the lower thoracic spine. There is chronic degenerative endplate change with sclerosis and subchondral cyst formation   in the thoracic spine with associated degenerative vertebral body height loss. No evidence of an acute displaced fracture.     CANAL/FORAMINA: No high-grade osseous spinal canal stenosis. Scattered mild bilateral neural foraminal narrowing.    PARASPINAL: See separately dictated chest CT for intrathoracic findings.    LUMBAR SPINE CT:  VERTEBRA: Grade 1 retrolisthesis of L3 on L4. Posterior fusion from L4 through S1 with interbody disc spacers. No evidence of hardware fracture or loosening. There is mature osseous fusion across the vertebrae. Vertebral body heights are preserved. There   is a nondisplaced fracture of the right L1 transverse process.     CANAL/FORAMINA: Multilevel degenerative changes without high-grade osseous spinal canal stenosis. Bilateral neural foraminal narrowing is greatest at L3-L4, where there is at least moderate bilateral stenosis.    PARASPINAL: Separately dictated CT abdomen and pelvis for intra-abdominal and pelvic findings.      Impression    IMPRESSION:  THORACIC SPINE CT:  1.  No evidence of an acute osseous abnormality of the thoracic spine.    LUMBAR SPINE CT:  1.  Nondisplaced fracture of the right L1 transverse process.   2.  Degenerative and postoperative changes, as described.     Lumbar spine CT w/o contrast    Narrative    EXAM: CT THORACIC SPINE W/O CONTRAST, CT LUMBAR  SPINE W/O CONTRAST  LOCATION: [facility]  DATE/TIME: 3/28/2023 10:02 PM    INDICATION: Traumatic injury. Fall down stairs.  COMPARISON: Lumbar spine CT dated 2/26/2020  TECHNIQUE:  1) Dedicated axial, sagittal, and coronal images of the Thoracic Spine were generated utilizing CT chest source data and are separately reviewed. Dose reduction techniques were used.   2) Dedicated axial, sagittal, and coronal images of the Lumbar Spine were generated utilizing CT abdomen pelvis source data and are separately reviewed. Dose reduction techniques were used.     FINDINGS:    THORACIC SPINE CT:  VERTEBRA: Accentuation of the thoracic spine kyphosis. Right apex curvature centered in the midthoracic spine and left apex curvature of the lower thoracic spine. There is chronic degenerative endplate change with sclerosis and subchondral cyst formation   in the thoracic spine with associated degenerative vertebral body height loss. No evidence of an acute displaced fracture.     CANAL/FORAMINA: No high-grade osseous spinal canal stenosis. Scattered mild bilateral neural foraminal narrowing.    PARASPINAL: See separately dictated chest CT for intrathoracic findings.    LUMBAR SPINE CT:  VERTEBRA: Grade 1 retrolisthesis of L3 on L4. Posterior fusion from L4 through S1 with interbody disc spacers. No evidence of hardware fracture or loosening. There is mature osseous fusion across the vertebrae. Vertebral body heights are preserved. There   is a nondisplaced fracture of the right L1 transverse process.     CANAL/FORAMINA: Multilevel degenerative changes without high-grade osseous spinal canal stenosis. Bilateral neural foraminal narrowing is greatest at L3-L4, where there is at least moderate bilateral stenosis.    PARASPINAL: Separately dictated CT abdomen and pelvis for intra-abdominal and pelvic findings.      Impression    IMPRESSION:  THORACIC SPINE CT:  1.  No evidence of an acute osseous abnormality of  the thoracic spine.    LUMBAR SPINE CT:  1.  Nondisplaced fracture of the right L1 transverse process.   2.  Degenerative and postoperative changes, as described.

## 2023-03-31 NOTE — PROGRESS NOTES
A/O x4. VSS on RA. PIV SL. PRN Senna given for no BM since Tuesday. Pt discharged, but will check her INR before leaving. Left a voice message for pharmacicst about pt's INR result. Indep in RM. Denies pain. On schedule Robaxin for muscle spasm.

## 2023-04-01 NOTE — PROGRESS NOTES
Discharge AVS and medications reviewed with patient and spouse, no questions stated at this time. Patient discharged home with all belongings.

## 2023-04-02 ENCOUNTER — PATIENT OUTREACH (OUTPATIENT)
Dept: CARE COORDINATION | Facility: CLINIC | Age: 73
End: 2023-04-02
Payer: MEDICARE

## 2023-04-02 NOTE — PROGRESS NOTES
"Clinic Care Coordination Contact  [facility]: Post-Discharge Note  SITUATION                                                      Admission:    Admission Date: 03/28/23   Reason for Admission: Fall, initial encounter  Discharge:   Discharge Date: 03/31/23  Discharge Diagnosis: Muscle spasm    Closed fracture of transverse process of lumbar vertebra, initial encounter (H)    BACKGROUND                                                      Per hospital discharge summary and inpatient provider notes:    72 year old female who presents after falling backward down a staircase and hitting head multiple times    ASSESSMENT           Discharge Assessment  How are you doing now that you are home?: much better now that I adjusted my pain medications  How are your symptoms? (Red Flag symptoms escalate to triage hotline per guidelines): Improved  Do you feel your condition is stable enough to be safe at home until your provider visit?: Yes  Does the patient have their discharge instructions? : Yes  Does the patient have questions regarding their discharge instructions? : No  Were you started on any new medications or were there changes to any of your previous medications? : Yes  Does the patient have all of their medications?: Yes  Do you have questions regarding any of your medications? : No  Discharge follow-up appointment scheduled within 14 calendar days? : No  Is patient agreeable to assistance with scheduling? : No (patient states she will call in the morning)       Patient states she is much more comfortable since she self-adjusted her pain medication regimen. She states she told hospital stay she was \"not getting enough\" but they didn't make adjustments to help with her pain. Patient states she is taking 2 tablets Tylenol/codeine three times per day, robaxin 2 tablets three times per day and one table valium at bedtime. She notes looking up the maximum recommended dosages of these medications and she is not " exceeding them. Suggested that she confirm dosages with pharmacist today but she declines. States she was able to get 7 hours of sleep last night. No further questions or concerns per patient. 24/7 [facility] nurse triage phone number provided to patient.              PLAN                                                      Outpatient Plan:     Follow up with primary care provider, Patrice Robbins, within 7 days for hospital follow- up.         No future appointments.      For any urgent concerns, please contact our 24 hour nurse triage line: [phone] ([phone])         Carina Gaston RN

## 2023-10-05 NOTE — ED NOTES
"[facility]  ED Nurse Handoff Report    ED Chief complaint: Fall      ED Diagnosis:   Final diagnoses:   Closed fracture of transverse process of lumbar vertebra, initial encounter (H)   Muscle spasm   Fall, initial encounter       Code Status: Full Code    Allergies:   Allergies   Allergen Reactions    Estrogens     Hmg-Coa-R Inhibitors Muscle Pain (Myalgia)    Hydrocodone        Patient Story: pt had a slip and fall down some stairs, striking her back and head, on coumadin   Focused Assessment:  Pt with R sided lower back pain/spasms    Treatments and/or interventions provided: labs, imaging, meds   Patient's response to treatments and/or interventions: tolerated well     To be done/followed up on inpatient unit:  pain control     Does this patient have any cognitive concerns?:  none    Activity level - Baseline/Home:  Independent  Activity Level - Current:   Stand with Assist    Patient's Preferred language: English   Needed?: No    Isolation: None  Infection: Not Applicable  Patient tested for COVID 19 prior to admission: NO  Bariatric?: No    Vital Signs:   Vitals:    03/28/23 2006 03/28/23 2145   BP: 131/76 (!) 150/75   Pulse: 82 83   Resp: 16    Temp: 98  F (36.7  C)    TempSrc: Oral    SpO2: 99% 100%   Weight: 61.2 kg (135 lb)    Height: 1.626 m (5' 4\")        Cardiac Rhythm:Cardiac Rhythm: (S) Atrial fibrillation    Was the PSS-3 completed:   Yes  What interventions are required if any?               Family Comments: family @ bedside   OBS brochure/video discussed/provided to patient/family: Yes              Name of person given brochure if not patient:               Relationship to patient:     For the majority of the shift this patient's behavior was Green.   Behavioral interventions performed were none.    ED NURSE PHONE NUMBER: *[id number]         " Have You Had Dysport Before?: has had dysport previous_has_had_dysport